# Patient Record
Sex: MALE | Race: WHITE | Employment: FULL TIME | URBAN - METROPOLITAN AREA
[De-identification: names, ages, dates, MRNs, and addresses within clinical notes are randomized per-mention and may not be internally consistent; named-entity substitution may affect disease eponyms.]

---

## 2018-07-30 ENCOUNTER — OFFICE VISIT (OUTPATIENT)
Dept: INTERNAL MEDICINE CLINIC | Facility: CLINIC | Age: 63
End: 2018-07-30
Payer: COMMERCIAL

## 2018-07-30 VITALS
BODY MASS INDEX: 37.04 KG/M2 | OXYGEN SATURATION: 95 % | SYSTOLIC BLOOD PRESSURE: 138 MMHG | RESPIRATION RATE: 16 BRPM | HEIGHT: 71.83 IN | DIASTOLIC BLOOD PRESSURE: 80 MMHG | WEIGHT: 270.5 LBS | HEART RATE: 96 BPM

## 2018-07-30 DIAGNOSIS — R94.31 ABNORMAL EKG: ICD-10-CM

## 2018-07-30 DIAGNOSIS — R03.0 ELEVATED BLOOD PRESSURE READING: Primary | ICD-10-CM

## 2018-07-30 DIAGNOSIS — Z12.5 SCREENING FOR PROSTATE CANCER: ICD-10-CM

## 2018-07-30 DIAGNOSIS — E66.09 CLASS 2 OBESITY DUE TO EXCESS CALORIES WITHOUT SERIOUS COMORBIDITY WITH BODY MASS INDEX (BMI) OF 36.0 TO 36.9 IN ADULT: ICD-10-CM

## 2018-07-30 PROBLEM — E66.812 CLASS 2 OBESITY DUE TO EXCESS CALORIES WITHOUT SERIOUS COMORBIDITY WITH BODY MASS INDEX (BMI) OF 36.0 TO 36.9 IN ADULT: Status: ACTIVE | Noted: 2018-07-30

## 2018-07-30 PROCEDURE — 99214 OFFICE O/P EST MOD 30 MIN: CPT | Performed by: INTERNAL MEDICINE

## 2018-07-30 PROCEDURE — 93000 ELECTROCARDIOGRAM COMPLETE: CPT | Performed by: INTERNAL MEDICINE

## 2018-07-30 NOTE — PROGRESS NOTES
Ac Gastelum is here for physical examination. He recently had cataract surgery and during the procedure and elevated blood pressure. He denies any chest pain shortness of breath. No orthopnea PND or pedal edema.   Did perform an EKG which showed a sinus rhythm

## 2018-08-17 ENCOUNTER — TELEPHONE (OUTPATIENT)
Dept: INTERNAL MEDICINE CLINIC | Facility: CLINIC | Age: 63
End: 2018-08-17

## 2018-08-17 DIAGNOSIS — R94.31 ABNORMAL EKG: ICD-10-CM

## 2018-08-17 DIAGNOSIS — R03.0 ELEVATED BLOOD PRESSURE READING: Primary | ICD-10-CM

## 2018-08-17 NOTE — TELEPHONE ENCOUNTER
Serena Sood called and wanted to verify order for treadmill CPX.  They need a new order for just treadmill stress

## 2018-08-20 ENCOUNTER — HOSPITAL ENCOUNTER (OUTPATIENT)
Dept: CV DIAGNOSTICS | Facility: HOSPITAL | Age: 63
Discharge: HOME | End: 2018-08-20
Attending: INTERNAL MEDICINE
Payer: COMMERCIAL

## 2018-08-20 DIAGNOSIS — R03.0 ELEVATED BLOOD PRESSURE READING: ICD-10-CM

## 2018-08-20 DIAGNOSIS — R94.31 ABNORMAL EKG: ICD-10-CM

## 2018-08-20 PROCEDURE — 93017 CV STRESS TEST TRACING ONLY: CPT | Performed by: INTERNAL MEDICINE

## 2018-08-20 PROCEDURE — 93018 CV STRESS TEST I&R ONLY: CPT | Performed by: INTERNAL MEDICINE

## 2018-08-22 NOTE — PROGRESS NOTES
Left a message with Hattie Arredondo. Hattie Arredondo did return my phone call. I informed him that the stress test was normal but blood pressure was elevated. Did recommend he measures and checks his blood pressure daily for a month and then fax results to me.   [facility]

## 2018-08-29 NOTE — PROGRESS NOTES
Spoke with patient on 8/29/18 regarding blood work needed per . Patient stated he was going out of town and will call to schedule blood work when he turns in September.

## 2019-08-29 ENCOUNTER — WALK IN (OUTPATIENT)
Dept: URGENT CARE | Age: 64
End: 2019-08-29

## 2019-08-29 VITALS
BODY MASS INDEX: 37.93 KG/M2 | SYSTOLIC BLOOD PRESSURE: 140 MMHG | TEMPERATURE: 98.9 F | DIASTOLIC BLOOD PRESSURE: 100 MMHG | WEIGHT: 280 LBS | HEIGHT: 72 IN | HEART RATE: 110 BPM

## 2019-08-29 DIAGNOSIS — J02.9 SORE THROAT: ICD-10-CM

## 2019-08-29 DIAGNOSIS — R03.0 ELEVATED BLOOD-PRESSURE READING WITHOUT DIAGNOSIS OF HYPERTENSION: ICD-10-CM

## 2019-08-29 DIAGNOSIS — H66.91 RIGHT OTITIS MEDIA, UNSPECIFIED OTITIS MEDIA TYPE: Primary | ICD-10-CM

## 2019-08-29 DIAGNOSIS — R05.9 COUGH: ICD-10-CM

## 2019-08-29 LAB
INTERNAL PROCEDURAL CONTROLS ACCEPTABLE: YES
S PYO AG THROAT QL IA.RAPID: NEGATIVE

## 2019-08-29 PROCEDURE — 87880 STREP A ASSAY W/OPTIC: CPT | Performed by: NURSE PRACTITIONER

## 2019-08-29 PROCEDURE — 99203 OFFICE O/P NEW LOW 30 MIN: CPT | Performed by: NURSE PRACTITIONER

## 2019-08-29 RX ORDER — AMOXICILLIN 875 MG/1
875 TABLET, COATED ORAL 2 TIMES DAILY
Qty: 20 TABLET | Refills: 0 | Status: SHIPPED | OUTPATIENT
Start: 2019-08-29 | End: 2019-09-08

## 2019-08-29 ASSESSMENT — ENCOUNTER SYMPTOMS
SORE THROAT: 1
CHILLS: 1
SINUS PRESSURE: 0
ALLERGIC/IMMUNOLOGIC NEGATIVE: 1
COUGH: 1
NEUROLOGICAL NEGATIVE: 1
FEVER: 1
ENDOCRINE NEGATIVE: 1
VOICE CHANGE: 0
GASTROINTESTINAL NEGATIVE: 1
WHEEZING: 0
RHINORRHEA: 0
CHEST TIGHTNESS: 0
SINUS PAIN: 0
EYES NEGATIVE: 1
SHORTNESS OF BREATH: 0

## 2020-04-24 ENCOUNTER — TELEPHONE (OUTPATIENT)
Dept: INTERNAL MEDICINE CLINIC | Facility: CLINIC | Age: 65
End: 2020-04-24

## 2020-04-27 ENCOUNTER — OFFICE VISIT (OUTPATIENT)
Dept: INTERNAL MEDICINE CLINIC | Facility: CLINIC | Age: 65
End: 2020-04-27
Payer: COMMERCIAL

## 2020-04-27 VITALS
HEART RATE: 98 BPM | OXYGEN SATURATION: 94 % | WEIGHT: 285 LBS | DIASTOLIC BLOOD PRESSURE: 120 MMHG | HEIGHT: 71.1 IN | SYSTOLIC BLOOD PRESSURE: 160 MMHG | TEMPERATURE: 99 F | BODY MASS INDEX: 39.46 KG/M2 | RESPIRATION RATE: 18 BRPM

## 2020-04-27 DIAGNOSIS — R03.0 ELEVATED BLOOD PRESSURE READING: Primary | ICD-10-CM

## 2020-04-27 DIAGNOSIS — E66.01 CLASS 2 SEVERE OBESITY DUE TO EXCESS CALORIES WITH SERIOUS COMORBIDITY AND BODY MASS INDEX (BMI) OF 39.0 TO 39.9 IN ADULT (HCC): ICD-10-CM

## 2020-04-27 DIAGNOSIS — Z01.818 PRE-OP TESTING: ICD-10-CM

## 2020-04-27 DIAGNOSIS — S69.92XA INJURY OF FINGER OF LEFT HAND, INITIAL ENCOUNTER: ICD-10-CM

## 2020-04-27 PROCEDURE — 93000 ELECTROCARDIOGRAM COMPLETE: CPT | Performed by: INTERNAL MEDICINE

## 2020-04-27 PROCEDURE — 99214 OFFICE O/P EST MOD 30 MIN: CPT | Performed by: INTERNAL MEDICINE

## 2020-04-27 PROCEDURE — 80053 COMPREHEN METABOLIC PANEL: CPT | Performed by: INTERNAL MEDICINE

## 2020-04-27 PROCEDURE — 85025 COMPLETE CBC W/AUTO DIFF WBC: CPT | Performed by: INTERNAL MEDICINE

## 2020-04-27 RX ORDER — LISINOPRIL 20 MG/1
20 TABLET ORAL DAILY
Qty: 30 TABLET | Refills: 11 | Status: SHIPPED | OUTPATIENT
Start: 2020-04-27 | End: 2021-04-29

## 2020-04-27 NOTE — PROGRESS NOTES
Brie Tyler is a 55-year-old gentleman who recently injured his left middle finger. He is scheduled to have surgery tomorrow. This is to be done under local block. His blood pressure is elevated. Brie Tyler is not seen a physician in over a year.   He denies any chest

## 2020-05-22 ENCOUNTER — TELEPHONE (OUTPATIENT)
Dept: INTERNAL MEDICINE CLINIC | Facility: CLINIC | Age: 65
End: 2020-05-22

## 2020-05-22 NOTE — TELEPHONE ENCOUNTER
Lakia from [facility] Dr. Sussy Dominguez office called to let Dr. Alfredito Pedroza know that the patient's recent blood pressure was 139/100. She states that she advised the patient to come in for a visit.

## 2020-10-26 ENCOUNTER — OFFICE VISIT (OUTPATIENT)
Dept: INTERNAL MEDICINE CLINIC | Facility: CLINIC | Age: 65
End: 2020-10-26
Payer: COMMERCIAL

## 2020-10-26 VITALS
HEART RATE: 93 BPM | RESPIRATION RATE: 18 BRPM | SYSTOLIC BLOOD PRESSURE: 118 MMHG | WEIGHT: 282.5 LBS | OXYGEN SATURATION: 98 % | TEMPERATURE: 97 F | HEIGHT: 72.08 IN | DIASTOLIC BLOOD PRESSURE: 74 MMHG | BODY MASS INDEX: 38.26 KG/M2

## 2020-10-26 DIAGNOSIS — I10 ESSENTIAL HYPERTENSION: ICD-10-CM

## 2020-10-26 DIAGNOSIS — Z01.818 PRE-OP EXAM: Primary | ICD-10-CM

## 2020-10-26 PROCEDURE — 99214 OFFICE O/P EST MOD 30 MIN: CPT | Performed by: INTERNAL MEDICINE

## 2020-10-26 PROCEDURE — 3008F BODY MASS INDEX DOCD: CPT | Performed by: INTERNAL MEDICINE

## 2020-10-26 PROCEDURE — 80053 COMPREHEN METABOLIC PANEL: CPT | Performed by: INTERNAL MEDICINE

## 2020-10-26 PROCEDURE — 93000 ELECTROCARDIOGRAM COMPLETE: CPT | Performed by: INTERNAL MEDICINE

## 2020-10-26 PROCEDURE — 3074F SYST BP LT 130 MM HG: CPT | Performed by: INTERNAL MEDICINE

## 2020-10-26 PROCEDURE — 3078F DIAST BP <80 MM HG: CPT | Performed by: INTERNAL MEDICINE

## 2020-10-26 NOTE — PROGRESS NOTES
This is a preop physical for Cr Parker. He is going to have a tenolysis left middle finger. This is [facility] to [facility] [place] [facility] [place]. He has no chief complaints at the present time.     Social history he is  does not smoke consumes about 1-2 al

## 2021-03-15 DIAGNOSIS — Z23 NEED FOR VACCINATION: ICD-10-CM

## 2021-04-29 RX ORDER — LISINOPRIL 20 MG/1
TABLET ORAL
Qty: 90 TABLET | Refills: 1 | Status: SHIPPED | OUTPATIENT
Start: 2021-04-29 | End: 2021-09-04

## 2021-05-24 ENCOUNTER — OFFICE VISIT (OUTPATIENT)
Dept: INTERNAL MEDICINE CLINIC | Facility: CLINIC | Age: 66
End: 2021-05-24
Payer: COMMERCIAL

## 2021-05-24 VITALS
WEIGHT: 285 LBS | SYSTOLIC BLOOD PRESSURE: 125 MMHG | OXYGEN SATURATION: 95 % | DIASTOLIC BLOOD PRESSURE: 85 MMHG | BODY MASS INDEX: 39 KG/M2 | HEART RATE: 94 BPM | RESPIRATION RATE: 18 BRPM

## 2021-05-24 DIAGNOSIS — N40.1 BENIGN PROSTATIC HYPERPLASIA WITH NOCTURIA: ICD-10-CM

## 2021-05-24 DIAGNOSIS — I10 ESSENTIAL HYPERTENSION: Primary | ICD-10-CM

## 2021-05-24 DIAGNOSIS — R35.1 BENIGN PROSTATIC HYPERPLASIA WITH NOCTURIA: ICD-10-CM

## 2021-05-24 PROCEDURE — 3074F SYST BP LT 130 MM HG: CPT | Performed by: INTERNAL MEDICINE

## 2021-05-24 PROCEDURE — 99213 OFFICE O/P EST LOW 20 MIN: CPT | Performed by: INTERNAL MEDICINE

## 2021-05-24 PROCEDURE — 3079F DIAST BP 80-89 MM HG: CPT | Performed by: INTERNAL MEDICINE

## 2021-05-24 RX ORDER — FINASTERIDE 5 MG/1
5 TABLET, FILM COATED ORAL DAILY
Qty: 30 TABLET | Refills: 11 | Status: SHIPPED | OUTPATIENT
Start: 2021-05-24 | End: 2021-09-20

## 2021-05-24 NOTE — PROGRESS NOTES
Problem 1 hypertension he denies headaches dizziness chest pain shortness of breath problem #2 did describe weight reducing diet problem 3 nocturia. Patient has not received physical with vaccination either has a reluctance to do this.     Physical examina

## 2021-09-04 RX ORDER — LISINOPRIL 20 MG/1
20 TABLET ORAL DAILY
Qty: 90 TABLET | Refills: 3 | Status: SHIPPED | OUTPATIENT
Start: 2021-09-04 | End: 2021-11-09

## 2021-09-20 RX ORDER — FINASTERIDE 5 MG/1
TABLET, FILM COATED ORAL
Qty: 90 TABLET | Refills: 3 | Status: SHIPPED | OUTPATIENT
Start: 2021-09-20

## 2021-11-09 DIAGNOSIS — I10 PRIMARY HYPERTENSION: Primary | ICD-10-CM

## 2021-11-09 RX ORDER — LISINOPRIL 20 MG/1
TABLET ORAL
Qty: 60 TABLET | Refills: 0 | Status: SHIPPED | OUTPATIENT
Start: 2021-11-09

## 2022-03-03 ENCOUNTER — TELEPHONE (OUTPATIENT)
Dept: INTERNAL MEDICINE CLINIC | Facility: CLINIC | Age: 67
End: 2022-03-03

## 2022-03-03 NOTE — TELEPHONE ENCOUNTER
Called patient to set up blood work appointment. Patient will call his insurance to make sure that it is okay for him to be drawn here in our office. If so he will call for an appointment if not orders will be mailed to him.

## 2022-03-09 RX ORDER — FINASTERIDE 5 MG/1
TABLET, FILM COATED ORAL
Qty: 180 TABLET | Refills: 1 | Status: SHIPPED | OUTPATIENT
Start: 2022-03-09

## 2022-05-10 RX ORDER — FINASTERIDE 5 MG/1
5 TABLET, FILM COATED ORAL DAILY
Qty: 30 TABLET | Refills: 0 | Status: SHIPPED | OUTPATIENT
Start: 2022-05-10

## 2022-05-10 RX ORDER — LISINOPRIL 20 MG/1
20 TABLET ORAL DAILY
Qty: 30 TABLET | Refills: 0 | Status: SHIPPED | OUTPATIENT
Start: 2022-05-10 | End: 2022-05-10

## 2022-05-10 RX ORDER — LISINOPRIL 20 MG/1
TABLET ORAL
Qty: 30 TABLET | Refills: 0 | Status: SHIPPED | OUTPATIENT
Start: 2022-05-10 | End: 2022-08-04

## 2022-05-10 NOTE — TELEPHONE ENCOUNTER
Future appt:    Last Appointment:  Visit date not found  No results found for: CHOLEST, HDL, LDL, TRIGLY, TRIG  No results found for: EAG, A1C  No results found for: T4F, TSH, TSHT4    No follow-ups on file.

## 2022-05-31 ENCOUNTER — TELEPHONE (OUTPATIENT)
Dept: INTERNAL MEDICINE CLINIC | Facility: CLINIC | Age: 67
End: 2022-05-31

## 2022-05-31 RX ORDER — LISINOPRIL 20 MG/1
TABLET ORAL
Qty: 30 TABLET | Refills: 0 | OUTPATIENT
Start: 2022-05-31

## 2022-08-01 DIAGNOSIS — Z13.21 ENCOUNTER FOR VITAMIN DEFICIENCY SCREENING: Primary | ICD-10-CM

## 2022-08-04 ENCOUNTER — NURSE ONLY (OUTPATIENT)
Dept: INTERNAL MEDICINE CLINIC | Facility: CLINIC | Age: 67
End: 2022-08-04
Payer: COMMERCIAL

## 2022-08-04 DIAGNOSIS — Z13.21 ENCOUNTER FOR VITAMIN DEFICIENCY SCREENING: ICD-10-CM

## 2022-08-04 DIAGNOSIS — I10 PRIMARY HYPERTENSION: Primary | ICD-10-CM

## 2022-08-04 LAB
ALBUMIN SERPL-MCNC: 3.5 G/DL (ref 3.4–5)
ALBUMIN/GLOB SERPL: 0.9 {RATIO} (ref 1–2)
ALP LIVER SERPL-CCNC: 57 U/L
ALT SERPL-CCNC: 38 U/L
ANION GAP SERPL CALC-SCNC: 6 MMOL/L (ref 0–18)
AST SERPL-CCNC: 27 U/L (ref 15–37)
BASOPHILS # BLD AUTO: 0.07 X10(3) UL (ref 0–0.2)
BASOPHILS NFR BLD AUTO: 1 %
BILIRUB SERPL-MCNC: 0.7 MG/DL (ref 0.1–2)
BUN BLD-MCNC: 16 MG/DL (ref 7–18)
CALCIUM BLD-MCNC: 9 MG/DL (ref 8.5–10.1)
CHLORIDE SERPL-SCNC: 110 MMOL/L (ref 98–112)
CHOLEST SERPL-MCNC: 213 MG/DL (ref ?–200)
CO2 SERPL-SCNC: 25 MMOL/L (ref 21–32)
COMPLEXED PSA SERPL-MCNC: 0.85 NG/ML (ref ?–4)
CREAT BLD-MCNC: 1.06 MG/DL
EOSINOPHIL # BLD AUTO: 0.14 X10(3) UL (ref 0–0.7)
EOSINOPHIL NFR BLD AUTO: 1.9 %
ERYTHROCYTE [DISTWIDTH] IN BLOOD BY AUTOMATED COUNT: 13.2 %
FASTING PATIENT LIPID ANSWER: YES
FASTING STATUS PATIENT QL REPORTED: YES
GFR SERPLBLD BASED ON 1.73 SQ M-ARVRAT: 77 ML/MIN/1.73M2 (ref 60–?)
GLOBULIN PLAS-MCNC: 3.8 G/DL (ref 2.8–4.4)
GLUCOSE BLD-MCNC: 97 MG/DL (ref 70–99)
HCT VFR BLD AUTO: 45.7 %
HDLC SERPL-MCNC: 42 MG/DL (ref 40–59)
HGB BLD-MCNC: 14.9 G/DL
IMM GRANULOCYTES # BLD AUTO: 0.05 X10(3) UL (ref 0–1)
IMM GRANULOCYTES NFR BLD: 0.7 %
LDLC SERPL CALC-MCNC: 147 MG/DL (ref ?–100)
LYMPHOCYTES # BLD AUTO: 1.89 X10(3) UL (ref 1–4)
LYMPHOCYTES NFR BLD AUTO: 25.9 %
MCH RBC QN AUTO: 29.1 PG (ref 26–34)
MCHC RBC AUTO-ENTMCNC: 32.6 G/DL (ref 31–37)
MCV RBC AUTO: 89.3 FL
MONOCYTES # BLD AUTO: 0.69 X10(3) UL (ref 0.1–1)
MONOCYTES NFR BLD AUTO: 9.5 %
NEUTROPHILS # BLD AUTO: 4.46 X10 (3) UL (ref 1.5–7.7)
NEUTROPHILS # BLD AUTO: 4.46 X10(3) UL (ref 1.5–7.7)
NEUTROPHILS NFR BLD AUTO: 61 %
NONHDLC SERPL-MCNC: 171 MG/DL (ref ?–130)
OSMOLALITY SERPL CALC.SUM OF ELEC: 293 MOSM/KG (ref 275–295)
PLATELET # BLD AUTO: 258 10(3)UL (ref 150–450)
POTASSIUM SERPL-SCNC: 4.5 MMOL/L (ref 3.5–5.1)
PROT SERPL-MCNC: 7.3 G/DL (ref 6.4–8.2)
RBC # BLD AUTO: 5.12 X10(6)UL
SODIUM SERPL-SCNC: 141 MMOL/L (ref 136–145)
TRIGL SERPL-MCNC: 132 MG/DL (ref 30–149)
VIT D+METAB SERPL-MCNC: 32.4 NG/ML (ref 30–100)
VLDLC SERPL CALC-MCNC: 25 MG/DL (ref 0–30)
WBC # BLD AUTO: 7.3 X10(3) UL (ref 4–11)

## 2022-08-04 PROCEDURE — 85025 COMPLETE CBC W/AUTO DIFF WBC: CPT | Performed by: INTERNAL MEDICINE

## 2022-08-04 PROCEDURE — 82306 VITAMIN D 25 HYDROXY: CPT | Performed by: INTERNAL MEDICINE

## 2022-08-04 PROCEDURE — 80061 LIPID PANEL: CPT | Performed by: INTERNAL MEDICINE

## 2022-08-04 PROCEDURE — 84153 ASSAY OF PSA TOTAL: CPT | Performed by: INTERNAL MEDICINE

## 2022-08-04 PROCEDURE — 80053 COMPREHEN METABOLIC PANEL: CPT | Performed by: INTERNAL MEDICINE

## 2022-08-04 RX ORDER — LISINOPRIL 20 MG/1
20 TABLET ORAL DAILY
Qty: 90 TABLET | Refills: 3 | Status: SHIPPED | OUTPATIENT
Start: 2022-08-04

## 2022-08-04 RX ORDER — FINASTERIDE 5 MG/1
5 TABLET, FILM COATED ORAL DAILY
Qty: 90 TABLET | Refills: 3 | Status: SHIPPED | OUTPATIENT
Start: 2022-08-04

## 2023-08-08 ENCOUNTER — OFFICE VISIT (OUTPATIENT)
Dept: INTERNAL MEDICINE CLINIC | Facility: CLINIC | Age: 68
End: 2023-08-08
Payer: COMMERCIAL

## 2023-08-08 VITALS
DIASTOLIC BLOOD PRESSURE: 90 MMHG | WEIGHT: 253.19 LBS | SYSTOLIC BLOOD PRESSURE: 124 MMHG | HEART RATE: 100 BPM | OXYGEN SATURATION: 99 % | BODY MASS INDEX: 34 KG/M2 | TEMPERATURE: 98 F

## 2023-08-08 DIAGNOSIS — Z12.5 SPECIAL SCREENING, PROSTATE CANCER: ICD-10-CM

## 2023-08-08 DIAGNOSIS — E78.5 DYSLIPIDEMIA: ICD-10-CM

## 2023-08-08 DIAGNOSIS — E66.3 OVERWEIGHT ON EXAMINATION: ICD-10-CM

## 2023-08-08 DIAGNOSIS — I10 PRIMARY HYPERTENSION: Primary | ICD-10-CM

## 2023-08-08 RX ORDER — FINASTERIDE 5 MG/1
5 TABLET, FILM COATED ORAL DAILY
Qty: 90 TABLET | Refills: 3 | Status: SHIPPED | OUTPATIENT
Start: 2023-08-08

## 2023-08-08 RX ORDER — LISINOPRIL 20 MG/1
20 TABLET ORAL DAILY
Qty: 90 TABLET | Refills: 3 | Status: SHIPPED | OUTPATIENT
Start: 2023-08-08

## 2023-08-08 NOTE — PROGRESS NOTES
Is a very pleasant 75-year-old gentleman here for blood pressure issues. Patient is on medication. Denies any headaches dizziness chest pain shortness of breath. Patient does have a history of nocturia. Currently is on finasteride. Other issue is overweight. Iva Leonard is exercising and eating appropriately and is lost 40 pounds. Still need to lose a few more pounds. Also has a history of dyslipidemia. Currently is not fasting. Physical examination very pleasant individual no acute distress normocephalic nonicteric carotids 1+ no bruits no thyromegaly or bruit. Lungs clear to auscultation. No cervical lymphadenopathy. Cardiovascular system S1-S2 regular. Abdomen soft nontender no organomegaly rebound or guarding bowel sounds active no bruits. Prostate mildly enlarged smooth no nodules. Extremities no edema cyanosis or clubbing no focal neurological signs. Psych appropriate mood and affect. Impression #1 hypertension with well-controlled refill of lisinopril. Check CBC and CMP problem #2 prostate cancer screening check PSA problem 3 overweight continue with dieting and exercise. Problem #4 dyslipidemia fasting lab ordered.

## 2023-08-10 ENCOUNTER — NURSE ONLY (OUTPATIENT)
Dept: INTERNAL MEDICINE CLINIC | Facility: CLINIC | Age: 68
End: 2023-08-10
Payer: COMMERCIAL

## 2023-08-10 DIAGNOSIS — I10 PRIMARY HYPERTENSION: ICD-10-CM

## 2023-08-10 DIAGNOSIS — Z12.5 SPECIAL SCREENING, PROSTATE CANCER: ICD-10-CM

## 2023-08-10 DIAGNOSIS — E78.5 DYSLIPIDEMIA: ICD-10-CM

## 2023-08-10 LAB
BASOPHILS # BLD AUTO: 0.06 X10(3) UL (ref 0–0.2)
BASOPHILS NFR BLD AUTO: 0.9 %
CHOLEST SERPL-MCNC: 197 MG/DL (ref ?–200)
COMPLEXED PSA SERPL-MCNC: 0.89 NG/ML (ref ?–4)
EOSINOPHIL # BLD AUTO: 0.21 X10(3) UL (ref 0–0.7)
EOSINOPHIL NFR BLD AUTO: 3 %
ERYTHROCYTE [DISTWIDTH] IN BLOOD BY AUTOMATED COUNT: 13.3 %
FASTING PATIENT LIPID ANSWER: YES
HCT VFR BLD AUTO: 45.2 %
HDLC SERPL-MCNC: 49 MG/DL (ref 40–59)
HGB BLD-MCNC: 14.9 G/DL
IMM GRANULOCYTES # BLD AUTO: 0.03 X10(3) UL (ref 0–1)
IMM GRANULOCYTES NFR BLD: 0.4 %
LDLC SERPL CALC-MCNC: 133 MG/DL (ref ?–100)
LYMPHOCYTES # BLD AUTO: 2.05 X10(3) UL (ref 1–4)
LYMPHOCYTES NFR BLD AUTO: 29.3 %
MCH RBC QN AUTO: 29.5 PG (ref 26–34)
MCHC RBC AUTO-ENTMCNC: 33 G/DL (ref 31–37)
MCV RBC AUTO: 89.5 FL
MONOCYTES # BLD AUTO: 0.62 X10(3) UL (ref 0.1–1)
MONOCYTES NFR BLD AUTO: 8.9 %
NEUTROPHILS # BLD AUTO: 4.03 X10 (3) UL (ref 1.5–7.7)
NEUTROPHILS # BLD AUTO: 4.03 X10(3) UL (ref 1.5–7.7)
NEUTROPHILS NFR BLD AUTO: 57.5 %
NONHDLC SERPL-MCNC: 148 MG/DL (ref ?–130)
PLATELET # BLD AUTO: 239 10(3)UL (ref 150–450)
RBC # BLD AUTO: 5.05 X10(6)UL
TRIGL SERPL-MCNC: 83 MG/DL (ref 30–149)
VLDLC SERPL CALC-MCNC: 15 MG/DL (ref 0–30)
WBC # BLD AUTO: 7 X10(3) UL (ref 4–11)

## 2023-08-10 PROCEDURE — 80061 LIPID PANEL: CPT | Performed by: INTERNAL MEDICINE

## 2023-08-10 PROCEDURE — 85025 COMPLETE CBC W/AUTO DIFF WBC: CPT | Performed by: INTERNAL MEDICINE

## 2023-10-18 ENCOUNTER — TELEPHONE (OUTPATIENT)
Dept: FAMILY MEDICINE CLINIC | Facility: CLINIC | Age: 68
End: 2023-10-18

## 2024-02-02 ENCOUNTER — OFFICE VISIT (OUTPATIENT)
Dept: INTERNAL MEDICINE CLINIC | Facility: CLINIC | Age: 69
End: 2024-02-02
Payer: COMMERCIAL

## 2024-02-02 VITALS
HEART RATE: 78 BPM | RESPIRATION RATE: 14 BRPM | DIASTOLIC BLOOD PRESSURE: 70 MMHG | TEMPERATURE: 98 F | WEIGHT: 239 LBS | HEIGHT: 72 IN | BODY MASS INDEX: 32.37 KG/M2 | OXYGEN SATURATION: 98 % | SYSTOLIC BLOOD PRESSURE: 122 MMHG

## 2024-02-02 DIAGNOSIS — Z12.5 PROSTATE CANCER SCREENING: ICD-10-CM

## 2024-02-02 DIAGNOSIS — Z00.00 ANNUAL PHYSICAL EXAM: Primary | ICD-10-CM

## 2024-02-02 DIAGNOSIS — R35.0 BENIGN PROSTATIC HYPERPLASIA WITH URINARY FREQUENCY: ICD-10-CM

## 2024-02-02 DIAGNOSIS — Z12.11 COLON CANCER SCREENING: ICD-10-CM

## 2024-02-02 DIAGNOSIS — I10 ESSENTIAL HYPERTENSION: ICD-10-CM

## 2024-02-02 DIAGNOSIS — N40.1 BENIGN PROSTATIC HYPERPLASIA WITH URINARY FREQUENCY: ICD-10-CM

## 2024-02-02 DIAGNOSIS — Z00.00 ENCOUNTER FOR ANNUAL HEALTH EXAMINATION: ICD-10-CM

## 2024-02-02 DIAGNOSIS — Z01.84 IMMUNITY STATUS TESTING: ICD-10-CM

## 2024-02-02 DIAGNOSIS — Z13.6 ENCOUNTER FOR ABDOMINAL AORTIC ANEURYSM (AAA) SCREENING: ICD-10-CM

## 2024-02-02 PROBLEM — E66.09 CLASS 2 OBESITY DUE TO EXCESS CALORIES WITHOUT SERIOUS COMORBIDITY WITH BODY MASS INDEX (BMI) OF 36.0 TO 36.9 IN ADULT: Status: RESOLVED | Noted: 2018-07-30 | Resolved: 2024-02-02

## 2024-02-02 PROBLEM — E66.812 CLASS 2 OBESITY DUE TO EXCESS CALORIES WITHOUT SERIOUS COMORBIDITY WITH BODY MASS INDEX (BMI) OF 36.0 TO 36.9 IN ADULT: Status: RESOLVED | Noted: 2018-07-30 | Resolved: 2024-02-02

## 2024-02-02 RX ORDER — LISINOPRIL 20 MG/1
20 TABLET ORAL DAILY
Qty: 90 TABLET | Refills: 3 | Status: SHIPPED | OUTPATIENT
Start: 2024-02-02

## 2024-02-02 NOTE — PROGRESS NOTES
Subjective:   Simon Harrison is a 68 year old male who presents for a MA (Medicare Advantage) Supervisit (Once per calendar year)   and scheduled follow up of multiple significant but stable problems.   HPI  Normal state of health  No complaints    PAST MEDICAL, SOCIAL, FAMILY HISTORIES REVIEWED WITH PT    History/Other:   Fall Risk Assessment:   He has been screened for Falls and is low risk.      Cognitive Assessment:   He had a completely normal cognitive assessment - see flowsheet entries       Functional Ability/Status:   Simon Harrison has a completely normal functional assessment. See flowsheet for details.      Depression Screening (PHQ-2/PHQ-9): PHQ-2 SCORE: 0  , done 2/2/2024            Advanced Directives:   He does NOT have a Living Will. [Do you have a living will?: No]  He does NOT have a Power of  for Health Care. [Do you have a healthcare power of ?: No]  Discussed Advance Care Planning with patient (and family/surrogate if present). Standard forms made available to patient in After Visit Summary.      Patient Active Problem List   Diagnosis    Essential hypertension    Benign prostatic hyperplasia with urinary frequency     Allergies:  He has No Known Allergies.    Current Medications:  Outpatient Medications Marked as Taking for the 2/2/24 encounter (Office Visit) with Gray Yoder MD   Medication Sig    lisinopril 20 MG Oral Tab Take 1 tablet (20 mg total) by mouth daily.    finasteride 5 MG Oral Tab Take 1 tablet (5 mg total) by mouth daily.       Medical History:  He  has no past medical history on file.  Surgical History:  He  has no past surgical history on file.   Family History:  His family history is not on file.  Social History:  He  reports that he has quit smoking. He has never used smokeless tobacco. He reports current alcohol use. He reports that he does not use drugs.    Tobacco:  He smoked tobacco in the past but quit greater than 12 months ago.  Social History     Tobacco Use      Smoking status: Former      Smokeless tobacco: Never       CAGE Alcohol Screen:   CAGE screening score of 0 on 2/2/2024, showing low risk of alcohol abuse.      Patient Care Team:  Gray Yoder MD as PCP - General (Internal Medicine)    Review of Systems  A comprehensive 10 point review of systems was completed.     Pertinent positives and negatives noted in the HPI.      Objective:   Physical Exam  General: No acute distress. Alert and oriented x 3.  HEENT: Normocephalic atraumatic. Moist mucous membranes. EOM-I. PERRLA. Anicteric.  Neck: No lymphadenopathy.   Respiratory: Clear to auscultation bilaterally. No wheezes. No rhonchi.  Cardiovascular: S1, S2. Regular rate and rhythm.   Abdomen: Soft,  no pain.  nontender, nondistended.  Positive bowel sounds. .  Neurologic: No focal neurological deficits.   Musculoskeletal: Moves all extremities.  Extremities: No edema or cyanosis.  Integument: No rashes or lesions.    Psychiatric: Appropriate mood and affect.    /70   Pulse 78   Temp 97.8 °F (36.6 °C)   Resp 14   Ht 6' (1.829 m)   Wt 239 lb (108.4 kg)   SpO2 98%   BMI 32.41 kg/m²  Estimated body mass index is 32.41 kg/m² as calculated from the following:    Height as of this encounter: 6' (1.829 m).    Weight as of this encounter: 239 lb (108.4 kg).    Medicare Hearing Assessment:   Hearing Screening    Screening Method: Whisper Test  Whisper Test Result: Pass               Visual Acuity:   Right Eye Visual Acuity: Corrected Right Eye Chart Acuity: 20/20   Left Eye Visual Acuity: Corrected Left Eye Chart Acuity: 20/20   Both Eyes Visual Acuity: Corrected Both Eyes Chart Acuity: 20/20   Able To Tolerate Visual Acuity: Yes        Assessment & Plan:   Simon Harrison is a 68 year old male who presents for a Medicare Assessment.     1. Annual physical exam (Primary)  2. Encounter for annual health examination  3. Immunity status testing  -     HCV Antibody; Future; Expected date:  02/02/2024  4. Prostate cancer screening  -     PSA Total, Screen; Future; Expected date: 02/02/2024  5. Essential hypertension  -     CBC With Differential With Platelet; Future; Expected date: 02/02/2024  -     Comp Metabolic Panel (14); Future; Expected date: 02/02/2024  -     Lipid Panel; Future; Expected date: 02/02/2024  -     TSH W Reflex To Free T4; Future; Expected date: 02/02/2024  -     Urinalysis, Routine; Future; Expected date: 02/02/2024  6. Benign prostatic hyperplasia with urinary frequency  7. Colon cancer screening  -     Occult Blood, Fecal, FIT Immunoassay; Future; Expected date: 02/02/2024  Other orders  -     Lisinopril; Take 1 tablet (20 mg total) by mouth daily.  Dispense: 90 tablet; Refill: 3     Immunity status testing  -     HCV Antibody; Future; Expected date: 02/02/2024   Prostate cancer screening  -     PSA Total, Screen; Future; Expected date: 02/02/2024  . Essential hypertension-controlled. Cont current med therapy  -     CBC With Differential With Platelet; Future; Expected date: 02/02/2024  -     Comp Metabolic Panel (14); Future; Expected date: 02/02/2024  -     Lipid Panel; Future; Expected date: 02/02/2024  -     TSH W Reflex To Free T4; Future; Expected date: 02/02/2024  -     Urinalysis, Routine; Future; Expected date: 02/02/2024   Benign prostatic hyperplasia with urinary frequency- stable monitor    Declines all vaccines    Check FOBT as he declies c-scope  The patient indicates understanding of these issues and agrees to the plan.  Continue with current treatment plan.  Lab work ordered.  Reinforced healthy diet, lifestyle, and exercise.      Return in about 1 year (around 2/2/2025) for HRA.     Gray Yoder MD, 2/2/2024     Supplementary Documentation:   General Health:  In the past six months, have you lost more than 10 pounds without trying?: 2 - No  Has your appetite been poor?: No  Type of Diet: Balanced;Vegetarian;Low Salt  How does the patient maintain a good energy  level?: Appropriate Exercise;Stretching;Other  How would you describe your daily physical activity?: Heavy  How would you describe your current health state?: Good  How do you maintain positive mental well-being?: Social Interaction;Visiting Friends;Visiting Family  On a scale of 0 to 10, with 0 being no pain and 10 being severe pain, what is your pain level?: 0 - (None)  In the past six months, have you experienced urine leakage?: 0-No  At any time do you feel concerned for the safety/well-being of yourself and/or your children, in your home or elsewhere?: No  Have you had any immunizations at another office such as Influenza, Hepatitis B, Tetanus, or Pneumococcal?: No        Siomn Harrison's SCREENING SCHEDULE   Tests on this list are recommended by your physician but may not be covered, or covered at this frequency, by your insurer.   Please check with your insurance carrier before scheduling to verify coverage.   PREVENTATIVE SERVICES FREQUENCY &  COVERAGE DETAILS LAST COMPLETION DATE   Diabetes Screening    Fasting Blood Sugar / Glucose    One screening every 12 months if never tested or if previously tested but not diagnosed with pre-diabetes   One screening every 6 months if diagnosed with pre-diabetes Lab Results   Component Value Date    GLU 97 08/04/2022        Cardiovascular Disease Screening    Lipid Panel  Cholesterol  Lipoprotein (HDL)  Triglycerides Covered every 5 years for all Medicare beneficiaries without apparent signs or symptoms of cardiovascular disease Lab Results   Component Value Date    CHOLEST 197 08/10/2023    HDL 49 08/10/2023     (H) 08/10/2023    TRIG 83 08/10/2023         Electrocardiogram (EKG)   Covered if needed at Welcome to Medicare, and non-screening if indicated for medical reasons 10/28/2020      Ultrasound Screening for Abdominal Aortic Aneurysm (AAA) Covered once in a lifetime for one of the following risk factors    Men who are 65-75 years old and have ever smoked     Anyone with a family history -     Colorectal Cancer Screening  Covered for ages 50-85; only need ONE of the following:    Colonoscopy   Covered every 10 years    Covered every 2 years if patient is at high risk or previous colonoscopy was abnormal -    Health Maintenance   Topic Date Due    Colorectal Cancer Screening  Never done       Flexible Sigmoidoscopy   Covered every 4 years -    Fecal Occult Blood Test Covered annually -   Prostate Cancer Screening    Prostate-Specific Antigen (PSA) Annually No results found for: \"PSA\"  Health Maintenance   Topic Date Due    PSA  08/10/2025      Immunizations    Influenza Covered once per flu season  Please get every year -  No recommendations at this time    Pneumococcal Each vaccine ([id number] & [id number]) covered once after 65 Prevnar 13: -    [id number]: -     No recommendations at this time    Hepatitis B One screening covered for patients with certain risk factors   -  No recommendations at this time    Tetanus Toxoid Not covered by Medicare Part B unless medically necessary (cut with metal); may be covered with your pharmacy prescription benefits -    Tetanus, Diptheria and Pertusis TD and TDaP Not covered by Medicare Part B -  No recommendations at this time    Zoster Not covered by Medicare Part B; may be covered with your pharmacy  prescription benefits -  No recommendations at this time     Annual Monitoring of Persistent Medications (ACE/ARB, digoxin diuretics, anticonvulsants)    Potassium Annually Lab Results   Component Value Date    K 4.5 08/04/2022         Creatinine   Annually Lab Results   Component Value Date    CREATSERUM 1.06 08/04/2022         BUN Annually Lab Results   Component Value Date    BUN 16 08/04/2022       Drug Serum Conc Annually No results found for: \"DIGOXIN\", \"DIG\", \"VALP\"

## 2024-02-02 NOTE — PATIENT INSTRUCTIONS
Simon Harrison's SCREENING SCHEDULE   Tests on this list are recommended by your physician but may not be covered, or covered at this frequency, by your insurer.   Please check with your insurance carrier before scheduling to verify coverage.   PREVENTATIVE SERVICES FREQUENCY &  COVERAGE DETAILS LAST COMPLETION DATE   Diabetes Screening    Fasting Blood Sugar / Glucose    One screening every 12 months if never tested or if previously tested but not diagnosed with pre-diabetes   One screening every 6 months if diagnosed with pre-diabetes Lab Results   Component Value Date    GLU 97 08/04/2022        Cardiovascular Disease Screening    Lipid Panel  Cholesterol  Lipoprotein (HDL)  Triglycerides Covered every 5 years for all Medicare beneficiaries without apparent signs or symptoms of cardiovascular disease Lab Results   Component Value Date    CHOLEST 197 08/10/2023    HDL 49 08/10/2023     (H) 08/10/2023    TRIG 83 08/10/2023         Electrocardiogram (EKG)   Covered if needed at Welcome to Medicare, and non-screening if indicated for medical reasons 10/28/2020      Ultrasound Screening for Abdominal Aortic Aneurysm (AAA) Covered once in a lifetime for one of the following risk factors   • Men who are 65-75 years old and have ever smoked   • Anyone with a family history -     Colorectal Cancer Screening  Covered for ages 50-85; only need ONE of the following:    Colonoscopy   Covered every 10 years    Covered every 2 years if patient is at high risk or previous colonoscopy was abnormal -    Health Maintenance   Topic Date Due   • Colorectal Cancer Screening  Never done       Flexible Sigmoidoscopy   Covered every 4 years -    Fecal Occult Blood Test Covered annually -   Prostate Cancer Screening    Prostate-Specific Antigen (PSA) Annually No results found for: \"PSA\"  Health Maintenance   Topic Date Due   • PSA  08/10/2025      Immunizations    Influenza Covered once per flu season  Please get every year  -  Influenza Vaccine(1) Never done    Pneumococcal Each vaccine (Ejfyoxo21 & Mlejtfbvw09) covered once after 65 Prevnar 13: -    Siywouvwz93: -     Pneumococcal Vaccination(1 of 1 - PCV) Never done    Hepatitis B One screening covered for patients with certain risk factors   -  No recommendations at this time    Tetanus Toxoid Not covered by Medicare Part B unless medically necessary (cut with metal); may be covered with your pharmacy prescription benefits -    Tetanus, Diptheria and Pertusis TD and TDaP Not covered by Medicare Part B -  No recommendations at this time    Zoster Not covered by Medicare Part B; may be covered with your pharmacy  prescription benefits -  Zoster Vaccines(1 of 2) Never done     Annual Monitoring of Persistent Medications (ACE/ARB, digoxin diuretics, anticonvulsants)    Potassium Annually Lab Results   Component Value Date    K 4.5 08/04/2022         Creatinine   Annually Lab Results   Component Value Date    CREATSERUM 1.06 08/04/2022         BUN Annually Lab Results   Component Value Date    BUN 16 08/04/2022       Drug Serum Conc Annually No results found for: \"DIGOXIN\", \"DIG\", \"VALP\"

## 2024-03-06 RX ORDER — FINASTERIDE 5 MG/1
5 TABLET, FILM COATED ORAL DAILY
Qty: 90 TABLET | Refills: 3 | OUTPATIENT
Start: 2024-03-06

## 2024-03-13 ENCOUNTER — HOSPITAL ENCOUNTER (EMERGENCY)
Facility: HOSPITAL | Age: 69
Discharge: HOME | End: 2024-03-13
Attending: EMERGENCY MEDICINE
Payer: MEDICARE

## 2024-03-13 ENCOUNTER — HOSPITAL ENCOUNTER (OUTPATIENT)
Dept: ULTRASOUND IMAGING | Age: 69
Discharge: HOME | End: 2024-03-13
Attending: INTERNAL MEDICINE
Payer: MEDICARE

## 2024-03-13 ENCOUNTER — APPOINTMENT (OUTPATIENT)
Dept: CT IMAGING | Facility: HOSPITAL | Age: 69
End: 2024-03-13
Attending: EMERGENCY MEDICINE
Payer: MEDICARE

## 2024-03-13 ENCOUNTER — LAB ENCOUNTER (OUTPATIENT)
Dept: LAB | Age: 69
End: 2024-03-13
Attending: INTERNAL MEDICINE
Payer: MEDICARE

## 2024-03-13 ENCOUNTER — TELEPHONE (OUTPATIENT)
Dept: INTERNAL MEDICINE CLINIC | Facility: CLINIC | Age: 69
End: 2024-03-13

## 2024-03-13 VITALS
SYSTOLIC BLOOD PRESSURE: 138 MMHG | BODY MASS INDEX: 32.1 KG/M2 | RESPIRATION RATE: 16 BRPM | WEIGHT: 237 LBS | OXYGEN SATURATION: 100 % | HEIGHT: 72 IN | TEMPERATURE: 98 F | HEART RATE: 64 BPM | DIASTOLIC BLOOD PRESSURE: 98 MMHG

## 2024-03-13 DIAGNOSIS — Z01.84 IMMUNITY STATUS TESTING: ICD-10-CM

## 2024-03-13 DIAGNOSIS — Z12.5 PROSTATE CANCER SCREENING: ICD-10-CM

## 2024-03-13 DIAGNOSIS — I71.43 INFRARENAL ABDOMINAL AORTIC ANEURYSM (AAA) WITHOUT RUPTURE (HCC): Primary | ICD-10-CM

## 2024-03-13 DIAGNOSIS — I10 ESSENTIAL HYPERTENSION: ICD-10-CM

## 2024-03-13 DIAGNOSIS — Z13.6 ENCOUNTER FOR ABDOMINAL AORTIC ANEURYSM (AAA) SCREENING: ICD-10-CM

## 2024-03-13 LAB
ALBUMIN SERPL-MCNC: 4.5 G/DL (ref 3.2–4.8)
ALBUMIN/GLOB SERPL: 1.5 {RATIO} (ref 1–2)
ALP LIVER SERPL-CCNC: 50 U/L
ALT SERPL-CCNC: 19 U/L
ANION GAP SERPL CALC-SCNC: 3 MMOL/L (ref 0–18)
ANION GAP SERPL CALC-SCNC: <0 MMOL/L (ref 0–18)
AST SERPL-CCNC: 21 U/L (ref ?–34)
BASOPHILS # BLD AUTO: 0.09 X10(3) UL (ref 0–0.2)
BASOPHILS NFR BLD AUTO: 1.4 %
BILIRUB SERPL-MCNC: 0.8 MG/DL (ref 0.2–1.1)
BILIRUB UR QL: NEGATIVE
BUN BLD-MCNC: 17 MG/DL (ref 9–23)
BUN BLD-MCNC: 17 MG/DL (ref 9–23)
BUN/CREAT SERPL: 16 (ref 10–20)
CALCIUM BLD-MCNC: 9.5 MG/DL (ref 8.7–10.4)
CALCIUM BLD-MCNC: 9.6 MG/DL (ref 8.5–10.1)
CHLORIDE SERPL-SCNC: 108 MMOL/L (ref 98–112)
CHLORIDE SERPL-SCNC: 110 MMOL/L (ref 98–112)
CHOLEST SERPL-MCNC: 210 MG/DL (ref ?–200)
CLARITY UR: CLEAR
CO2 SERPL-SCNC: 26 MMOL/L (ref 21–32)
CO2 SERPL-SCNC: 30 MMOL/L (ref 21–32)
COMPLEXED PSA SERPL-MCNC: 1.08 NG/ML (ref ?–4)
CREAT BLD-MCNC: 1 MG/DL
CREAT BLD-MCNC: 1.06 MG/DL
DEPRECATED RDW RBC AUTO: 43 FL (ref 35.1–46.3)
EGFRCR SERPLBLD CKD-EPI 2021: 76 ML/MIN/1.73M2 (ref 60–?)
EGFRCR SERPLBLD CKD-EPI 2021: 82 ML/MIN/1.73M2 (ref 60–?)
EOSINOPHIL # BLD AUTO: 0.18 X10(3) UL (ref 0–0.7)
EOSINOPHIL NFR BLD AUTO: 2.7 %
ERYTHROCYTE [DISTWIDTH] IN BLOOD BY AUTOMATED COUNT: 13.2 % (ref 11–15)
FASTING PATIENT LIPID ANSWER: YES
FASTING STATUS PATIENT QL REPORTED: YES
GLOBULIN PLAS-MCNC: 3 G/DL (ref 2.8–4.4)
GLUCOSE BLD-MCNC: 103 MG/DL (ref 70–99)
GLUCOSE BLD-MCNC: 105 MG/DL (ref 70–99)
GLUCOSE UR-MCNC: NORMAL MG/DL
HCT VFR BLD AUTO: 44.5 %
HCV AB SERPL QL IA: NONREACTIVE
HDLC SERPL-MCNC: 50 MG/DL (ref 40–59)
HGB BLD-MCNC: 15 G/DL
HGB UR QL STRIP.AUTO: NEGATIVE
IMM GRANULOCYTES # BLD AUTO: 0.03 X10(3) UL (ref 0–1)
IMM GRANULOCYTES NFR BLD: 0.5 %
KETONES UR-MCNC: NEGATIVE MG/DL
LDLC SERPL CALC-MCNC: 147 MG/DL (ref ?–100)
LEUKOCYTE ESTERASE UR QL STRIP.AUTO: NEGATIVE
LYMPHOCYTES # BLD AUTO: 2.32 X10(3) UL (ref 1–4)
LYMPHOCYTES NFR BLD AUTO: 34.8 %
MCH RBC QN AUTO: 30.1 PG (ref 26–34)
MCHC RBC AUTO-ENTMCNC: 33.7 G/DL (ref 31–37)
MCV RBC AUTO: 89.2 FL
MONOCYTES # BLD AUTO: 0.55 X10(3) UL (ref 0.1–1)
MONOCYTES NFR BLD AUTO: 8.3 %
NEUTROPHILS # BLD AUTO: 3.49 X10 (3) UL (ref 1.5–7.7)
NEUTROPHILS # BLD AUTO: 3.49 X10(3) UL (ref 1.5–7.7)
NEUTROPHILS NFR BLD AUTO: 52.3 %
NITRITE UR QL STRIP.AUTO: NEGATIVE
NONHDLC SERPL-MCNC: 160 MG/DL (ref ?–130)
OSMOLALITY SERPL CALC.SUM OF ELEC: 284 MOSM/KG (ref 275–295)
OSMOLALITY SERPL CALC.SUM OF ELEC: 286 MOSM/KG (ref 275–295)
PH UR: 6 [PH] (ref 5–8)
PLATELET # BLD AUTO: 209 10(3)UL (ref 150–450)
POTASSIUM SERPL-SCNC: 4.3 MMOL/L (ref 3.5–5.1)
POTASSIUM SERPL-SCNC: 4.4 MMOL/L (ref 3.5–5.1)
PROT SERPL-MCNC: 7.5 G/DL (ref 5.7–8.2)
PROT UR-MCNC: NEGATIVE MG/DL
RBC # BLD AUTO: 4.99 X10(6)UL
SODIUM SERPL-SCNC: 136 MMOL/L (ref 136–145)
SODIUM SERPL-SCNC: 137 MMOL/L (ref 136–145)
SP GR UR STRIP: 1.02 (ref 1–1.03)
TRIGL SERPL-MCNC: 75 MG/DL (ref 30–149)
TSI SER-ACNC: 2.83 MIU/ML (ref 0.55–4.78)
UROBILINOGEN UR STRIP-ACNC: NORMAL
VLDLC SERPL CALC-MCNC: 14 MG/DL (ref 0–30)
WBC # BLD AUTO: 6.7 X10(3) UL (ref 4–11)

## 2024-03-13 PROCEDURE — 81003 URINALYSIS AUTO W/O SCOPE: CPT

## 2024-03-13 PROCEDURE — 80048 BASIC METABOLIC PNL TOTAL CA: CPT | Performed by: EMERGENCY MEDICINE

## 2024-03-13 PROCEDURE — 86803 HEPATITIS C AB TEST: CPT

## 2024-03-13 PROCEDURE — 74174 CTA ABD&PLVS W/CONTRAST: CPT | Performed by: EMERGENCY MEDICINE

## 2024-03-13 PROCEDURE — 99285 EMERGENCY DEPT VISIT HI MDM: CPT

## 2024-03-13 PROCEDURE — 99284 EMERGENCY DEPT VISIT MOD MDM: CPT

## 2024-03-13 PROCEDURE — 85025 COMPLETE CBC W/AUTO DIFF WBC: CPT

## 2024-03-13 PROCEDURE — 80061 LIPID PANEL: CPT

## 2024-03-13 PROCEDURE — 36415 COLL VENOUS BLD VENIPUNCTURE: CPT

## 2024-03-13 PROCEDURE — 84443 ASSAY THYROID STIM HORMONE: CPT

## 2024-03-13 PROCEDURE — 76706 US ABDL AORTA SCREEN AAA: CPT | Performed by: INTERNAL MEDICINE

## 2024-03-13 PROCEDURE — 80053 COMPREHEN METABOLIC PANEL: CPT | Performed by: EMERGENCY MEDICINE

## 2024-03-13 NOTE — ED PROVIDER NOTES
Patient Seen in: [facility] Emergency Department      History     Chief Complaint   Patient presents with    Abnormal Result     Stated Complaint: routine US, showed Abdominal aortic aneurysm measuring up to 7.5 cm maximally. *    Subjective:   HPI    Patient had a screening ultrasound done today and he was found to have an aortic aneurysm.  He denies any complaints.  Specifically no abdominal pain no back pain or pain in his legs.  No numbness or weakness in his extremities.    Does have a history of hypertension.      Objective:   History reviewed. No pertinent past medical history.           History reviewed. No pertinent surgical history.             Social History     Socioeconomic History    Marital status:    Tobacco Use    Smoking status: Former    Smokeless tobacco: Never   Vaping Use    Vaping Use: Never used   Substance and Sexual Activity    Alcohol use: Yes     Comment: occ    Drug use: Never              Review of Systems    Positive for stated complaint: routine US, showed Abdominal aortic aneurysm measuring up to 7.5 cm maximally. *  Other systems are as noted in HPI.  Constitutional and vital signs reviewed.      All other systems reviewed and negative except as noted above.    Physical Exam     ED Triage Vitals [03/13/24 0950]   BP (!) 160/113   Pulse 76   Resp 16   Temp 97.9 °F (36.6 °C)   Temp src Temporal   SpO2 97 %   O2 Device None (Room air)       Current:BP (!) 138/98   Pulse 64   Temp 97.9 °F (36.6 °C) (Temporal)   Resp 16   Ht 182.9 cm (6')   Wt 107.5 kg   SpO2 100%   BMI 32.14 kg/m²         Physical Exam    Physical Exam   Constitutional: Awake, alert, well appearing  Head: Normocephalic and atraumatic.   Eyes: Conjunctivae are normal. Pupils are equal, round, and reactive to light.   Neck: Normal range of motion. Neck supple.   Cardiovascular: Normal rate, regular rhythm  Pulmonary/Chest: Normal effort.  No accessory muscle use.  No clubbing, no cyanosis.  Abdominal:  Soft. Bowel sounds are normal.   Neurological: Pt is alert and oriented to person, place, and time. no cranial nerve deficits  Skin: Skin is warm and dry.  No palpable mass noted in the abdomen      Pulses easily detected bilateral lower extremities both which are warm well-perfused    ED Course     Labs Reviewed   BASIC METABOLIC PANEL (8) - Abnormal; Notable for the following components:       Result Value    Glucose 105 (*)     All other components within normal limits             CTA ABD/PEL (CPT=74174)    Result Date: 3/13/2024  CONCLUSION:  1. Large infrarenal abdominal aortic aneurysm measuring 7.5 x 7.4 x 8.8 centimeters. 2. 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 3. 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  COMMUNICATION:  The findings were communicated with Dr. Rodriguez at 1334 on 3/13/2024. There was appropriate read back.  LOCATION:  [facility]   Dictated by (CST): Barry Fuentes MD on 3/13/2024 at 1:28 PM     Finalized by (CST): Barry Fuentes MD on 3/13/2024 at 1:35 PM       US ABDOMINAL AORTIC ANEURYSM SCREENING (CPT=76706)    Result Date: 3/13/2024  CONCLUSION:    Abdominal aortic aneurysm measuring up to 7.5 cm maximally.    Results of this examination will be called to the patient's physician by the performing sonographic technologist.   Dictated by (CST): Norris Jean Baptitse MD on 3/13/2024 at 8:27 AM     Finalized by (CST): Norris Jean Baptiste MD on 3/13/2024 at 8:28 AM                  MDM          Differential diagnoses considered: Abdominal aortic aneurysm, ruptured aneurysm    -Case discussed with Dr. Najjar, vascular surgery.  Request we obtain a CT angiogram of his abdomen here and discharged the patient as he is asymptomatic.  He will arrange for follow-up appointment tomorrow to discuss next steps.    Discussed CT scan results with patient.  He expressed clear understanding of the importance of follow-up and plans on seeing Dr. Reynolds tomorrow.        *My  independent interpretation of radiographs: No free fluid in the abdomen    *Discussion of ongoing management of this patient's care included: Dr. Najjar, vascular surgery  *Comorbidities contributing to the complexity of decision making: n/a  *External charts reviewed: n/a  *Additional sources of history: n/a    Shared decision making was done by: patient, myself.                                     Medical Decision Making      Disposition and Plan     Clinical Impression:  1. Infrarenal abdominal aortic aneurysm (AAA) without rupture (HCC)         Disposition:  Discharge  3/13/2024  1:46 pm    Follow-up:  Najjar, Samer F, MD  [address] [place] [id number]  [phone]    Follow up  If you are not called today for an appointment tomorrow, call tomorrow morning for the next available appointment.          Medications Prescribed:  Current Discharge Medication List

## 2024-03-13 NOTE — TELEPHONE ENCOUNTER
PT Called \" I Need you to call Dr Yoder  and tell him to Call me Immediately \"    I try to ask what's this about and he just said \"I finished Imaging and I need him to call me Now\"    Pt hang up. Sorry.

## 2024-03-13 NOTE — PROGRESS NOTES
Attempted to call [facility],spoke with Shea, introduced and notified we are sending pt to ER  Shea stated she has million people calling and you will have to hold on  Gave report regarding AAA, 3 min later  Spoke with pt, still at [facility], would like to speak with Dr. Yoder  Notified Dr. Yoder is recommending to go to ER now, pt v/u  Dr. Yoder notified to contact pt

## 2024-03-13 NOTE — ED INITIAL ASSESSMENT (HPI)
Pt states he had a US today and was told he has an aneurysm his aorta.  Pt states denies symptoms.  No SOB, chest pain.

## 2024-03-13 NOTE — DISCHARGE INSTRUCTIONS
Your CT scan today shows a large  aortic aneurysm that would likely require a procedure to prevent rupture.  Return to the ER immediately if you develop any abdominal pain or back pain.

## 2024-03-14 ENCOUNTER — PATIENT OUTREACH (OUTPATIENT)
Dept: CASE MANAGEMENT | Age: 69
End: 2024-03-14

## 2024-03-14 ENCOUNTER — TELEPHONE (OUTPATIENT)
Facility: CLINIC | Age: 69
End: 2024-03-14

## 2024-03-14 ENCOUNTER — OFFICE VISIT (OUTPATIENT)
Facility: CLINIC | Age: 69
End: 2024-03-14

## 2024-03-14 VITALS — WEIGHT: 237 LBS | HEIGHT: 72 IN | RESPIRATION RATE: 18 BRPM | BODY MASS INDEX: 32.1 KG/M2

## 2024-03-14 DIAGNOSIS — I71.43 INFRARENAL ABDOMINAL AORTIC ANEURYSM (AAA) WITHOUT RUPTURE (HCC): Primary | ICD-10-CM

## 2024-03-14 PROBLEM — Z78.9 HEPATITIS C ANTIBODY TEST NEGATIVE: Status: ACTIVE | Noted: 2024-03-14

## 2024-03-14 PROBLEM — Z53.20 STATIN DECLINED: Status: ACTIVE | Noted: 2024-03-14

## 2024-03-14 PROBLEM — Z91.89 FRAMINGHAM CARDIAC RISK 10-20% IN NEXT 10 YEARS: Status: ACTIVE | Noted: 2024-03-14

## 2024-03-14 NOTE — PROGRESS NOTES
1st attempt ER f/up apt request  No answer, LVMTCB to schedule apt  PCP -existing apt (2/4/25), for MA supervisit, unable to contact  [facility] -existing apt (3/15)  Closing encounter

## 2024-03-14 NOTE — TELEPHONE ENCOUNTER
Patient seen in the office, needs aortic endograft procedure. Scheduled for 03/22/2024. Patient agreed.    Submitted prior authorization through [facility] Portal, pending clinical review.  Reference #: [id number]

## 2024-03-15 ENCOUNTER — TELEPHONE (OUTPATIENT)
Facility: CLINIC | Age: 69
End: 2024-03-15

## 2024-03-15 NOTE — TELEPHONE ENCOUNTER
Patient stats if able to place in letter \"urgent life threatening & monthly monitoring for 9 months\".

## 2024-03-15 NOTE — PROGRESS NOTES
3rd attempt ER f/up apt request    PCP -decline, pt doesn't want to schedule at this time  [facility] -existing apt (3/15)  Closing encounter

## 2024-03-15 NOTE — TELEPHONE ENCOUNTER
Patient calling states had a trip but cannot go due to procedure and asking if able to provide letter stating pt cannot travel due to procedure. If can send letter to through [employer] or sent through email. Needs [email or web address]

## 2024-03-15 NOTE — TELEPHONE ENCOUNTER
Called patient to let him know that insurance approved procedure. He was receptive. Confirmed date of procedure again.

## 2024-03-15 NOTE — PROGRESS NOTES
2nd attempt ER f/up apt request  No answer, LVMTCB to schedule apt  PCP -existing apt (2/4/25), for MA supervisit, unable to contact  VASC SURG -existing apt (3/15)  Closing encounter

## 2024-03-18 ENCOUNTER — TELEPHONE (OUTPATIENT)
Facility: CLINIC | Age: 69
End: 2024-03-18

## 2024-03-18 DIAGNOSIS — I71.43 INFRARENAL ABDOMINAL AORTIC ANEURYSM (AAA) WITHOUT RUPTURE (HCC): Primary | ICD-10-CM

## 2024-03-18 NOTE — TELEPHONE ENCOUNTER
Medical letter can be created stating that patient has a vascular condition (Abdominal Aortic Aneurysm) which requires surgical intervention as soon as possible. For this reason, patient is not allowed to travel and upcoming travelling plans should be cancelled at this time. Patient will need close monitoring (for at least one year) after surgical procedure.

## 2024-03-19 ENCOUNTER — EKG ENCOUNTER (OUTPATIENT)
Dept: LAB | Facility: HOSPITAL | Age: 69
End: 2024-03-19
Attending: SURGERY
Payer: MEDICARE

## 2024-03-19 ENCOUNTER — LAB ENCOUNTER (OUTPATIENT)
Dept: LAB | Facility: HOSPITAL | Age: 69
End: 2024-03-19
Attending: SURGERY
Payer: MEDICARE

## 2024-03-19 DIAGNOSIS — Z01.818 PRE-OP TESTING: ICD-10-CM

## 2024-03-19 LAB
ANTIBODY SCREEN: NEGATIVE
ATRIAL RATE: 89 BPM
INR BLD: 1.05 (ref 0.8–1.2)
MRSA DNA SPEC QL NAA+PROBE: NEGATIVE
P AXIS: 28 DEGREES
P-R INTERVAL: 220 MS
PROTHROMBIN TIME: 14.3 SECONDS (ref 11.6–14.8)
Q-T INTERVAL: 372 MS
QRS DURATION: 100 MS
QTC CALCULATION (BEZET): 452 MS
R AXIS: 8 DEGREES
RH BLOOD TYPE: NEGATIVE
RH BLOOD TYPE: NEGATIVE
T AXIS: 25 DEGREES
VENTRICULAR RATE: 89 BPM

## 2024-03-19 PROCEDURE — 86901 BLOOD TYPING SEROLOGIC RH(D): CPT

## 2024-03-19 PROCEDURE — 85610 PROTHROMBIN TIME: CPT

## 2024-03-19 PROCEDURE — 87641 MR-STAPH DNA AMP PROBE: CPT

## 2024-03-19 PROCEDURE — 86920 COMPATIBILITY TEST SPIN: CPT

## 2024-03-19 PROCEDURE — 93010 ELECTROCARDIOGRAM REPORT: CPT | Performed by: INTERNAL MEDICINE

## 2024-03-19 PROCEDURE — 93005 ELECTROCARDIOGRAM TRACING: CPT

## 2024-03-19 PROCEDURE — 86850 RBC ANTIBODY SCREEN: CPT

## 2024-03-19 PROCEDURE — 86900 BLOOD TYPING SEROLOGIC ABO: CPT

## 2024-03-19 PROCEDURE — 36415 COLL VENOUS BLD VENIPUNCTURE: CPT

## 2024-03-21 ENCOUNTER — LAB ENCOUNTER (OUTPATIENT)
Dept: LAB | Facility: HOSPITAL | Age: 69
End: 2024-03-21
Attending: SURGERY
Payer: MEDICARE

## 2024-03-21 DIAGNOSIS — Z01.818 PRE-OP TESTING: ICD-10-CM

## 2024-03-21 LAB — SARS-COV-2 RNA RESP QL NAA+PROBE: NOT DETECTED

## 2024-03-22 ENCOUNTER — ANESTHESIA EVENT (OUTPATIENT)
Dept: INTERVENTIONAL RADIOLOGY/VASCULAR | Facility: HOSPITAL | Age: 69
End: 2024-03-22
Payer: MEDICARE

## 2024-03-22 ENCOUNTER — HOSPITAL ENCOUNTER (INPATIENT)
Dept: INTERVENTIONAL RADIOLOGY/VASCULAR | Facility: HOSPITAL | Age: 69
LOS: 1 days | Discharge: HOME | End: 2024-03-23
Attending: SURGERY | Admitting: SURGERY
Payer: MEDICARE

## 2024-03-22 DIAGNOSIS — I71.43 INFRARENAL ABDOMINAL AORTIC ANEURYSM (AAA) WITHOUT RUPTURE (HCC): ICD-10-CM

## 2024-03-22 DIAGNOSIS — Z01.818 PRE-OP TESTING: Primary | ICD-10-CM

## 2024-03-22 PROCEDURE — B41C1ZZ FLUOROSCOPY OF PELVIC ARTERIES USING LOW OSMOLAR CONTRAST: ICD-10-PCS | Performed by: SURGERY

## 2024-03-22 PROCEDURE — 99222 1ST HOSP IP/OBS MODERATE 55: CPT | Performed by: HOSPITALIST

## 2024-03-22 PROCEDURE — B4101ZZ FLUOROSCOPY OF ABDOMINAL AORTA USING LOW OSMOLAR CONTRAST: ICD-10-PCS | Performed by: SURGERY

## 2024-03-22 PROCEDURE — 04V03DZ RESTRICTION OF ABDOMINAL AORTA WITH INTRALUMINAL DEVICE, PERCUTANEOUS APPROACH: ICD-10-PCS | Performed by: SURGERY

## 2024-03-22 RX ORDER — ACETAMINOPHEN 500 MG
1000 TABLET ORAL ONCE
Status: COMPLETED | OUTPATIENT
Start: 2024-03-22 | End: 2024-03-22

## 2024-03-22 RX ORDER — ONDANSETRON 2 MG/ML
INJECTION INTRAMUSCULAR; INTRAVENOUS AS NEEDED
Status: DISCONTINUED | OUTPATIENT
Start: 2024-03-22 | End: 2024-03-22 | Stop reason: SURG

## 2024-03-22 RX ORDER — CEFAZOLIN SODIUM/WATER 2 G/20 ML
SYRINGE (ML) INTRAVENOUS
Status: COMPLETED
Start: 2024-03-22 | End: 2024-03-22

## 2024-03-22 RX ORDER — DEXAMETHASONE SODIUM PHOSPHATE 4 MG/ML
VIAL (ML) INJECTION AS NEEDED
Status: DISCONTINUED | OUTPATIENT
Start: 2024-03-22 | End: 2024-03-22 | Stop reason: SURG

## 2024-03-22 RX ORDER — ACETAMINOPHEN 325 MG/1
650 TABLET ORAL EVERY 4 HOURS PRN
Status: DISCONTINUED | OUTPATIENT
Start: 2024-03-22 | End: 2024-03-23

## 2024-03-22 RX ORDER — MORPHINE SULFATE 4 MG/ML
4 INJECTION, SOLUTION INTRAMUSCULAR; INTRAVENOUS EVERY 10 MIN PRN
Status: DISCONTINUED | OUTPATIENT
Start: 2024-03-22 | End: 2024-03-22 | Stop reason: HOSPADM

## 2024-03-22 RX ORDER — FAMOTIDINE 20 MG/1
20 TABLET, FILM COATED ORAL ONCE
Status: COMPLETED | OUTPATIENT
Start: 2024-03-22 | End: 2024-03-22

## 2024-03-22 RX ORDER — HEPARIN SODIUM 1000 [USP'U]/ML
INJECTION, SOLUTION INTRAVENOUS; SUBCUTANEOUS AS NEEDED
Status: DISCONTINUED | OUTPATIENT
Start: 2024-03-22 | End: 2024-03-22 | Stop reason: SURG

## 2024-03-22 RX ORDER — HEPARIN SODIUM 1000 [USP'U]/ML
INJECTION, SOLUTION INTRAVENOUS; SUBCUTANEOUS
Status: COMPLETED
Start: 2024-03-22 | End: 2024-03-22

## 2024-03-22 RX ORDER — NALOXONE HYDROCHLORIDE 0.4 MG/ML
80 INJECTION, SOLUTION INTRAMUSCULAR; INTRAVENOUS; SUBCUTANEOUS AS NEEDED
Status: DISCONTINUED | OUTPATIENT
Start: 2024-03-22 | End: 2024-03-22 | Stop reason: HOSPADM

## 2024-03-22 RX ORDER — FAMOTIDINE 10 MG/ML
20 INJECTION, SOLUTION INTRAVENOUS ONCE
Status: COMPLETED | OUTPATIENT
Start: 2024-03-22 | End: 2024-03-22

## 2024-03-22 RX ORDER — GLYCOPYRROLATE 0.2 MG/ML
INJECTION, SOLUTION INTRAMUSCULAR; INTRAVENOUS AS NEEDED
Status: DISCONTINUED | OUTPATIENT
Start: 2024-03-22 | End: 2024-03-22 | Stop reason: SURG

## 2024-03-22 RX ORDER — SODIUM CHLORIDE 9 MG/ML
INJECTION, SOLUTION INTRAVENOUS CONTINUOUS
Status: DISCONTINUED | OUTPATIENT
Start: 2024-03-22 | End: 2024-03-22

## 2024-03-22 RX ORDER — MIDAZOLAM HYDROCHLORIDE 1 MG/ML
INJECTION INTRAMUSCULAR; INTRAVENOUS
Status: COMPLETED
Start: 2024-03-22 | End: 2024-03-22

## 2024-03-22 RX ORDER — ONDANSETRON 2 MG/ML
4 INJECTION INTRAMUSCULAR; INTRAVENOUS EVERY 6 HOURS PRN
Status: DISCONTINUED | OUTPATIENT
Start: 2024-03-22 | End: 2024-03-23

## 2024-03-22 RX ORDER — CEFAZOLIN SODIUM/WATER 2 G/20 ML
2 SYRINGE (ML) INTRAVENOUS EVERY 8 HOURS
Qty: 40 ML | Refills: 0 | Status: COMPLETED | OUTPATIENT
Start: 2024-03-22 | End: 2024-03-23

## 2024-03-22 RX ORDER — ROCURONIUM BROMIDE 10 MG/ML
INJECTION, SOLUTION INTRAVENOUS AS NEEDED
Status: DISCONTINUED | OUTPATIENT
Start: 2024-03-22 | End: 2024-03-22 | Stop reason: SURG

## 2024-03-22 RX ORDER — HYDRALAZINE HYDROCHLORIDE 20 MG/ML
10 INJECTION INTRAMUSCULAR; INTRAVENOUS
Status: DISCONTINUED | OUTPATIENT
Start: 2024-03-22 | End: 2024-03-23

## 2024-03-22 RX ORDER — HYDROCODONE BITARTRATE AND ACETAMINOPHEN 5; 325 MG/1; MG/1
2 TABLET ORAL EVERY 4 HOURS PRN
Status: DISCONTINUED | OUTPATIENT
Start: 2024-03-22 | End: 2024-03-23

## 2024-03-22 RX ORDER — MORPHINE SULFATE 10 MG/ML
6 INJECTION, SOLUTION INTRAMUSCULAR; INTRAVENOUS EVERY 10 MIN PRN
Status: DISCONTINUED | OUTPATIENT
Start: 2024-03-22 | End: 2024-03-22 | Stop reason: HOSPADM

## 2024-03-22 RX ORDER — PROTAMINE SULFATE 10 MG/ML
INJECTION, SOLUTION INTRAVENOUS AS NEEDED
Status: DISCONTINUED | OUTPATIENT
Start: 2024-03-22 | End: 2024-03-22 | Stop reason: SURG

## 2024-03-22 RX ORDER — HYDROMORPHONE HYDROCHLORIDE 1 MG/ML
0.4 INJECTION, SOLUTION INTRAMUSCULAR; INTRAVENOUS; SUBCUTANEOUS EVERY 5 MIN PRN
Status: DISCONTINUED | OUTPATIENT
Start: 2024-03-22 | End: 2024-03-22 | Stop reason: HOSPADM

## 2024-03-22 RX ORDER — ENOXAPARIN SODIUM 100 MG/ML
40 INJECTION SUBCUTANEOUS EVERY 24 HOURS
Status: DISCONTINUED | OUTPATIENT
Start: 2024-03-22 | End: 2024-03-23

## 2024-03-22 RX ORDER — ACETAMINOPHEN 500 MG
TABLET ORAL
Status: COMPLETED
Start: 2024-03-22 | End: 2024-03-22

## 2024-03-22 RX ORDER — NEOSTIGMINE METHYLSULFATE 1 MG/ML
INJECTION, SOLUTION INTRAVENOUS AS NEEDED
Status: DISCONTINUED | OUTPATIENT
Start: 2024-03-22 | End: 2024-03-22 | Stop reason: SURG

## 2024-03-22 RX ORDER — DEXTROSE, SODIUM CHLORIDE, SODIUM LACTATE, POTASSIUM CHLORIDE, AND CALCIUM CHLORIDE 5; .6; .31; .03; .02 G/100ML; G/100ML; G/100ML; G/100ML; G/100ML
INJECTION, SOLUTION INTRAVENOUS CONTINUOUS
Status: DISCONTINUED | OUTPATIENT
Start: 2024-03-22 | End: 2024-03-23

## 2024-03-22 RX ORDER — MORPHINE SULFATE 2 MG/ML
1 INJECTION, SOLUTION INTRAMUSCULAR; INTRAVENOUS EVERY 2 HOUR PRN
Status: DISCONTINUED | OUTPATIENT
Start: 2024-03-22 | End: 2024-03-23

## 2024-03-22 RX ORDER — SODIUM CHLORIDE, SODIUM LACTATE, POTASSIUM CHLORIDE, CALCIUM CHLORIDE 600; 310; 30; 20 MG/100ML; MG/100ML; MG/100ML; MG/100ML
INJECTION, SOLUTION INTRAVENOUS CONTINUOUS
Status: DISCONTINUED | OUTPATIENT
Start: 2024-03-22 | End: 2024-03-22 | Stop reason: HOSPADM

## 2024-03-22 RX ORDER — HYDROCODONE BITARTRATE AND ACETAMINOPHEN 5; 325 MG/1; MG/1
1 TABLET ORAL EVERY 4 HOURS PRN
Status: DISCONTINUED | OUTPATIENT
Start: 2024-03-22 | End: 2024-03-23

## 2024-03-22 RX ORDER — HYDROMORPHONE HYDROCHLORIDE 1 MG/ML
0.6 INJECTION, SOLUTION INTRAMUSCULAR; INTRAVENOUS; SUBCUTANEOUS EVERY 5 MIN PRN
Status: DISCONTINUED | OUTPATIENT
Start: 2024-03-22 | End: 2024-03-22 | Stop reason: HOSPADM

## 2024-03-22 RX ORDER — METOCLOPRAMIDE HYDROCHLORIDE 5 MG/ML
INJECTION INTRAMUSCULAR; INTRAVENOUS
Status: DISPENSED
Start: 2024-03-22 | End: 2024-03-22

## 2024-03-22 RX ORDER — CEFAZOLIN SODIUM/WATER 2 G/20 ML
SYRINGE (ML) INTRAVENOUS AS NEEDED
Status: DISCONTINUED | OUTPATIENT
Start: 2024-03-22 | End: 2024-03-22 | Stop reason: SURG

## 2024-03-22 RX ORDER — MIDAZOLAM HYDROCHLORIDE 1 MG/ML
INJECTION INTRAMUSCULAR; INTRAVENOUS AS NEEDED
Status: DISCONTINUED | OUTPATIENT
Start: 2024-03-22 | End: 2024-03-22 | Stop reason: SURG

## 2024-03-22 RX ORDER — MORPHINE SULFATE 4 MG/ML
4 INJECTION, SOLUTION INTRAMUSCULAR; INTRAVENOUS EVERY 2 HOUR PRN
Status: DISCONTINUED | OUTPATIENT
Start: 2024-03-22 | End: 2024-03-23

## 2024-03-22 RX ORDER — EPHEDRINE SULFATE 50 MG/ML
INJECTION, SOLUTION INTRAVENOUS AS NEEDED
Status: DISCONTINUED | OUTPATIENT
Start: 2024-03-22 | End: 2024-03-22 | Stop reason: SURG

## 2024-03-22 RX ORDER — ASPIRIN 81 MG/1
81 TABLET ORAL DAILY
Status: DISCONTINUED | OUTPATIENT
Start: 2024-03-23 | End: 2024-03-23

## 2024-03-22 RX ORDER — HYDROMORPHONE HYDROCHLORIDE 1 MG/ML
0.2 INJECTION, SOLUTION INTRAMUSCULAR; INTRAVENOUS; SUBCUTANEOUS EVERY 5 MIN PRN
Status: DISCONTINUED | OUTPATIENT
Start: 2024-03-22 | End: 2024-03-22 | Stop reason: HOSPADM

## 2024-03-22 RX ORDER — METOCLOPRAMIDE 10 MG/1
10 TABLET ORAL ONCE
Status: COMPLETED | OUTPATIENT
Start: 2024-03-22 | End: 2024-03-22

## 2024-03-22 RX ORDER — PROTAMINE SULFATE 10 MG/ML
INJECTION, SOLUTION INTRAVENOUS
Status: COMPLETED
Start: 2024-03-22 | End: 2024-03-22

## 2024-03-22 RX ORDER — LISINOPRIL 20 MG/1
20 TABLET ORAL DAILY
Status: DISCONTINUED | OUTPATIENT
Start: 2024-03-22 | End: 2024-03-23

## 2024-03-22 RX ORDER — SODIUM CHLORIDE, SODIUM LACTATE, POTASSIUM CHLORIDE, CALCIUM CHLORIDE 600; 310; 30; 20 MG/100ML; MG/100ML; MG/100ML; MG/100ML
INJECTION, SOLUTION INTRAVENOUS CONTINUOUS
Status: DISCONTINUED | OUTPATIENT
Start: 2024-03-22 | End: 2024-03-22

## 2024-03-22 RX ORDER — METOCLOPRAMIDE HYDROCHLORIDE 5 MG/ML
10 INJECTION INTRAMUSCULAR; INTRAVENOUS ONCE
Status: COMPLETED | OUTPATIENT
Start: 2024-03-22 | End: 2024-03-22

## 2024-03-22 RX ORDER — METOCLOPRAMIDE HYDROCHLORIDE 5 MG/ML
10 INJECTION INTRAMUSCULAR; INTRAVENOUS EVERY 8 HOURS PRN
Status: DISCONTINUED | OUTPATIENT
Start: 2024-03-22 | End: 2024-03-23

## 2024-03-22 RX ORDER — FAMOTIDINE 20 MG/1
TABLET, FILM COATED ORAL
Status: COMPLETED
Start: 2024-03-22 | End: 2024-03-22

## 2024-03-22 RX ORDER — MORPHINE SULFATE 4 MG/ML
2 INJECTION, SOLUTION INTRAMUSCULAR; INTRAVENOUS EVERY 10 MIN PRN
Status: DISCONTINUED | OUTPATIENT
Start: 2024-03-22 | End: 2024-03-22 | Stop reason: HOSPADM

## 2024-03-22 RX ORDER — MORPHINE SULFATE 2 MG/ML
2 INJECTION, SOLUTION INTRAMUSCULAR; INTRAVENOUS EVERY 2 HOUR PRN
Status: DISCONTINUED | OUTPATIENT
Start: 2024-03-22 | End: 2024-03-23

## 2024-03-22 RX ORDER — LIDOCAINE HYDROCHLORIDE 20 MG/ML
INJECTION, SOLUTION INFILTRATION; PERINEURAL
Status: COMPLETED
Start: 2024-03-22 | End: 2024-03-22

## 2024-03-22 RX ADMIN — ACETAMINOPHEN 650 MG: 325 TABLET ORAL at 16:57:00

## 2024-03-22 RX ADMIN — DEXAMETHASONE SODIUM PHOSPHATE 4 MG: 4 MG/ML VIAL (ML) INJECTION at 12:25:00

## 2024-03-22 RX ADMIN — SODIUM CHLORIDE: 9 INJECTION, SOLUTION INTRAVENOUS at 12:03:00

## 2024-03-22 RX ADMIN — NEOSTIGMINE METHYLSULFATE 5 MG: 1 INJECTION, SOLUTION INTRAVENOUS at 13:30:00

## 2024-03-22 RX ADMIN — EPHEDRINE SULFATE 5 MG: 50 INJECTION, SOLUTION INTRAVENOUS at 12:45:00

## 2024-03-22 RX ADMIN — PROTAMINE SULFATE 50 MG: 10 INJECTION, SOLUTION INTRAVENOUS at 13:18:00

## 2024-03-22 RX ADMIN — SODIUM CHLORIDE, SODIUM LACTATE, POTASSIUM CHLORIDE, CALCIUM CHLORIDE: 600; 310; 30; 20 INJECTION, SOLUTION INTRAVENOUS at 11:11:00

## 2024-03-22 RX ADMIN — METOCLOPRAMIDE 10 MG: 10 TABLET ORAL at 10:05:00

## 2024-03-22 RX ADMIN — EPHEDRINE SULFATE 5 MG: 50 INJECTION, SOLUTION INTRAVENOUS at 12:18:00

## 2024-03-22 RX ADMIN — FAMOTIDINE 20 MG: 20 TABLET, FILM COATED ORAL at 10:06:00

## 2024-03-22 RX ADMIN — HYDRALAZINE HYDROCHLORIDE 10 MG: 20 INJECTION INTRAMUSCULAR; INTRAVENOUS at 16:55:00

## 2024-03-22 RX ADMIN — ACETAMINOPHEN 1000 MG: 500 MG TABLET ORAL at 10:06:00

## 2024-03-22 RX ADMIN — SODIUM CHLORIDE: 9 INJECTION, SOLUTION INTRAVENOUS at 13:03:00

## 2024-03-22 RX ADMIN — ONDANSETRON 4 MG: 2 INJECTION INTRAMUSCULAR; INTRAVENOUS at 13:25:00

## 2024-03-22 RX ADMIN — SODIUM CHLORIDE: 9 INJECTION, SOLUTION INTRAVENOUS at 13:02:00

## 2024-03-22 RX ADMIN — GLYCOPYRROLATE 0.6 MG: 0.2 INJECTION, SOLUTION INTRAMUSCULAR; INTRAVENOUS at 13:30:00

## 2024-03-22 RX ADMIN — MIDAZOLAM HYDROCHLORIDE 2 MG: 1 INJECTION INTRAMUSCULAR; INTRAVENOUS at 11:51:00

## 2024-03-22 RX ADMIN — SODIUM CHLORIDE: 9 INJECTION, SOLUTION INTRAVENOUS at 09:30:00

## 2024-03-22 RX ADMIN — EPHEDRINE SULFATE 5 MG: 50 INJECTION, SOLUTION INTRAVENOUS at 12:35:00

## 2024-03-22 RX ADMIN — EPHEDRINE SULFATE 5 MG: 50 INJECTION, SOLUTION INTRAVENOUS at 12:20:00

## 2024-03-22 RX ADMIN — CEFAZOLIN SODIUM/WATER 2 G: 2 G/20 ML SYRINGE (ML) INTRAVENOUS at 21:12:00

## 2024-03-22 RX ADMIN — DEXTROSE, SODIUM CHLORIDE, SODIUM LACTATE, POTASSIUM CHLORIDE, AND CALCIUM CHLORIDE: 5; .6; .31; .03; .02 INJECTION, SOLUTION INTRAVENOUS at 15:42:00

## 2024-03-22 RX ADMIN — ROCURONIUM BROMIDE 10 MG: 10 INJECTION, SOLUTION INTRAVENOUS at 12:36:00

## 2024-03-22 RX ADMIN — CEFAZOLIN SODIUM/WATER 2 G: 2 G/20 ML SYRINGE (ML) INTRAVENOUS at 12:13:00

## 2024-03-22 RX ADMIN — EPHEDRINE SULFATE 5 MG: 50 INJECTION, SOLUTION INTRAVENOUS at 12:16:00

## 2024-03-22 RX ADMIN — SODIUM CHLORIDE, SODIUM LACTATE, POTASSIUM CHLORIDE, CALCIUM CHLORIDE: 600; 310; 30; 20 INJECTION, SOLUTION INTRAVENOUS at 12:03:00

## 2024-03-22 RX ADMIN — LISINOPRIL 20 MG: 20 TABLET ORAL at 15:42:00

## 2024-03-22 RX ADMIN — ROCURONIUM BROMIDE 50 MG: 10 INJECTION, SOLUTION INTRAVENOUS at 11:54:00

## 2024-03-22 RX ADMIN — ENOXAPARIN SODIUM 40 MG: 100 INJECTION SUBCUTANEOUS at 21:12:00

## 2024-03-22 RX ADMIN — ACETAMINOPHEN 650 MG: 325 TABLET ORAL at 21:42:00

## 2024-03-22 RX ADMIN — SODIUM CHLORIDE, SODIUM LACTATE, POTASSIUM CHLORIDE, CALCIUM CHLORIDE: 600; 310; 30; 20 INJECTION, SOLUTION INTRAVENOUS at 14:46:00

## 2024-03-22 RX ADMIN — HEPARIN SODIUM 10000 UNITS: 1000 INJECTION, SOLUTION INTRAVENOUS; SUBCUTANEOUS at 12:41:00

## 2024-03-22 NOTE — ANESTHESIA PROCEDURE NOTES
Airway  Date/Time: 3/22/2024 11:56 AM  Urgency: Elective    Airway not difficult    General Information and Staff    Patient location during procedure: OR  Anesthesiologist: Shivani Gallego MD  Performed: anesthesiologist   Performed by: Shivani Gallego MD  Authorized by: Shivani Gallego MD      Indications and Patient Condition  Indications for airway management: anesthesia  Spontaneous Ventilation: absent  Sedation level: deep  Preoxygenated: yes  Patient position: sniffing  Mask difficulty assessment: 1 - vent by mask  Planned trial extubation    Final Airway Details  Final airway type: endotracheal airway      Successful airway: ETT  Cuffed: yes   Successful intubation technique: direct laryngoscopy  Facilitating devices/methods: intubating stylet  Endotracheal tube insertion site: oral  Blade size: #4  ETT size (mm): 8.0    Cormack-Lehane Classification: grade I - full view of glottis  Placement verified by: capnometry   Measured from: teeth  ETT to teeth (cm): 23  Number of attempts at approach: 1  Ventilation between attempts: none  Number of other approaches attempted: 0    Additional Comments  Le # 4

## 2024-03-22 NOTE — ANESTHESIA PROCEDURE NOTES
Arterial Line    Date/Time: 3/22/2024 1:55 AM    Performed by: Shivani Gallego MD  Authorized by: Shivani Gallego MD    General Information and Staff    Procedure Start:  3/22/2024 1:55 AM  Procedure End:  3/22/2024 12:03 PM  Anesthesiologist:  Shivani Gallego MD  Performed By:  Anesthesiologist  Patient Location:  OR  Indication: continuous blood pressure monitoring and blood sampling needed    Site Identification: surface landmarks    Preanesthetic Checklist: 2 patient identifiers, IV checked, risks and benefits discussed, monitors and equipment checked, pre-op evaluation, timeout performed, anesthesia consent and sterile technique used    Procedure Details    Catheter Size:  20 G  Catheter Length:  1 and 3/4 inch  Catheter Type:  Arrow  Seldinger Technique?: Yes    Laterality:  Left  Site:  Radial artery  Site Prep: chlorhexidine    Line Secured:  Wrist Brace, tape and Tegaderm    Assessment    Events: patient tolerated procedure well with no complications      Medications  3/22/2024 1:55 AM      Additional Comments

## 2024-03-22 NOTE — CONSULTS
[facility]    PATIENT'S NAME: TAMERA MENDOZA   ATTENDING PHYSICIAN: Samer F. Najjar, MD   CONSULTING PHYSICIAN: Magdiel Hawthorne MD   PATIENT ACCOUNT#:   [id number]    LOCATION:  [facility] PACU 8 [facility] 10  MEDICAL RECORD #:   [id number]       YOB: 1955  ADMISSION DATE:       03/22/2024      CONSULT DATE:  03/22/2024    REPORT OF CONSULTATION      REASON FOR CONSULTATION:  Abdominal aortic aneurysm endograft repair.    HISTORY OF PRESENT ILLNESS:  The patient is a 68-year-old  male who was found on a screening study ultrasound to have abdominal aortic aneurysm.  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  The patient was asymptomatic and referred to Vascular Surgery, Dr. Najjar, and scheduled today for above-mentioned procedure.  Postoperatively transferred to PACU for further monitoring.    PAST MEDICAL HISTORY:  Essential hypertension.  The patient denies any other medical problems.  Recent imaging studies showing infrarenal abdominal aortic aneurysm.    PAST SURGICAL HISTORY:  None.    MEDICATIONS:  Please see medication reconciliation list.    ALLERGIES:  No known drug allergies.    SOCIAL HISTORY:  No tobacco, alcohol, or drug use.  Lives with his family.  Independent in his basic activities of daily living.    REVIEW OF SYSTEMS:  Currently resting in bed.  No abdominal pain.  No chest pain, no shortness of breath.  Other 12-point review of systems negative.      PHYSICAL EXAMINATION:    GENERAL:  Alert.  Oriented to time, place, and person.  No acute distress.  VITAL SIGNS:  Temperature 98.3, pulse 53, respiratory rate 18, blood pressure 133/86, pulse ox 99% on 2L nasal cannula oxygen.  HEENT:  Atraumatic.  Oropharynx clear, moist mucous membranes.  Ears, nose normal.  Eyes:  Anicteric  sclerae.  NECK:  Supple.  No lymphadenopathy.  Trachea midline.  Full range of motion.  LUNGS:  Clear to auscultation bilaterally.  Normal respiratory effort.  HEART:  Regular rate and rhythm.  S1, S2 auscultated.  No murmur.  ABDOMEN:  Soft, nondistended, no tenderness.  Positive bowel sounds.  EXTREMITIES:  Bilateral groin dressing.  No edema, clubbing, or cyanosis.  Dorsalis pedis pulses palpated bilaterally.  NEUROLOGIC:  Motor and sensory intact.    ASSESSMENT AND PLAN:    1.   Infrarenal abdominal aortic aneurysm, status post endovascular abdominal aortic aneurysm repair using Hoopa device with stent.  Pain control, DVT prophylaxis, low dose aspirin, monitor hemodynamic status.  2.   Essential hypertension.  Continue home medications and monitor.    Dictated By Magdiel Hawthorne MD  d: 03/22/2024 14:40:37  t: 03/22/2024 14:51:35  Job [id number]  FB/

## 2024-03-22 NOTE — OPERATIVE REPORT
[facility]     PATIENTS NAME: Simon Harrison  ATTENDING PHYSICIAN: Najjar, Samer F, MD  OPERATING PHYSICIAN: Samer F Najjar, MD  CSN: [id number]     LOCATION:  OR  MRN: [id number]    YOB: 1955  ADMISSION DATE: 3/22/2024  OPERATION DATE: 3/22/2024                OPERATIVE PROCEDURE REPORT       PRE-OPERATIVE DIAGNOSIS:  Abdominal Aortic Aneurysm    POST-OPERATIVE DIAGNOSIS: Same as above.    PROCEDURE PERFORMED: Percutaneous Endovascular Abdominal Aortic Aneurysm Repair with the Bacliff Excluder Endograft (CPT 66495, 16765)    ANESTHESIA: General    SURGEON: Samer F Najjar, MD    CONTRAST AMOUNT: 60 ml    ASSISTANT: Interventional radiology staff, Hermes Ramirez SA (standby)    EBL: 20 ml    COMPLICATIONS: None    SUMMARY OF CASE: Minor Type II endoleak    INDICATIONS: The patient is a 68 year old male with history of a large abdominal aortic aneurysm. The size of the aneurysm was 7.9 cm. The infrarenal AAA was felt to be suitable for endovascular repair which was recommended and accepted.  The risks and benefits of this repair were discussed with him and his family in detail.  Those include risk of bleeding, iliac artery dissection or rupture, distal embolization, kinking, femoral artery complications, renal dysfunction, endoleak, late aneurysm rupture, migration, limb thrombosis, structural graft failure, graft infection, among other complications. Patient understood, signed informed consent, and expressed wish to proceed.    DESCRIPTION OF PROCEDURE:  The patient was brought to the catheterization lab and laid supine on the table.  After induction of general endotracheal anesthesia, both groin areas were prepped and draped in the usual surgical sterile fashion.  Access was established using a percutaneous technique through accessing the common femoral artery and dilating each artery with an 8-Fr sheath.  Perclose closure devices were deployed at 90° angles, but the sutures were left loose .  8-Fr  sheaths were placed on both sides, and the patient was anticoagulated with 5,000 units of heparin.  The sheaths were then exchanged for a 16-English sheath on the right and 12-Fr sheath on the left over 180-cm superstiff Amplatz wires.  A 5-Fr pigtail catheter was then advanced via the left side and an aortogram and pelvic angiogram with a power injection of intravenous contrast was then performed which allowed us to measure the length of the aorta from the lowest renal artery to the ipsilateral common iliac artery bifurcation. The lowest renal artery was marked.   The pigtail catheter was then advanced to the suprarenal aorta through the contralateral side. The diameter of the main body of the endograft was previously chosen by evaluating the preoperative CT angiogram. Then based on our preoperative imaging and procedural imaging, a 23 mm x 14.5 mm x 12 cm device will allow the best option for seal. The main body was then advanced from the right side. The endograft was oriented under fluoroscopy to position the proximal marker of the endograft right under the lowest renal artery. The contralateral gate was oriented slightly anterior in order to facilitate subsequent cannulation. A repeat aortogram orthogonal to the neck with magnification for final positioning was obtained at a 15° cranial angulation. The long sheath was pulled back and the proximal trunk of the endograft was deployed by loosening the deployment white knob while exerting a continuous pull to open the contralateral gate while leaving the ipsilateral limb constrained.  The position of the endograft was confirmed with fluoroscopy. Once we were satisfied with the position, the final placement was achieved by disengaging the constraining mechanism. Then, the  pigtail catheter was exchanged over a 0.035 Glidewire Advantage wire which was then advanced from the contralateral side over a Sanders catheter and the gate was cannulated.   The marker pigtail  catheter was reintroduced through the left femoral sheath over an Amplatz wire and a retrograde left iliac angiogram was obtained to fabrice the common iliac artery bifurcation and confirm the length of the desired limb. A 16 mm x 14.5 mm x 14 cm contralateral limb device was prepared and advanced and deployed at the graft bifurcation and the distal end landed short of the bifurcation so a limb extender 16 mm x 14.5 mm x 10 cm was deployed and it landed just above the internal iliac artery takeoff.  This was followed by final deployment of the ipsilateral leg by rotating the gray deployment knob. Then an ipsilateral iliac bell-bottom endoprosthesis 16 mm x 18 mm x 11.5 cm was deployed and landed just above the ipsilateral internal iliac artery.  Then, the MOB balloon was used to angioplasty the neck, the sealing zones of the iliac limbs and the zone of overlap between the main body and the contralateral iliac limb. A completion angiogram was obtained while withdrawing blood from the iliac sheaths. This confirmed the absence of correctable endoleaks. There The sheaths and catheters were removed and the heparin was reversed with protamine. The arteriotomies were closed with tightening of the Perclose sutures. Pressure was held until hemostasis was achieved.  Dermabond was used to seal the arteriotomy incision sites followed by steri-strips, 4x4 gauze and Tegaderm over the incision sites.  Both feet were inspected and were warm and well perfused with palpable PT pulses. There were no complications.

## 2024-03-22 NOTE — ANESTHESIA PREPROCEDURE EVALUATION
Anesthesia PreOp Note    HPI:     Simon Harrison is a 68 year old male who presents for preoperative consultation requested by: * No surgeons listed *    Date of Surgery: 3/22/2024    * No procedures listed *  Indication: * No pre-op diagnosis entered *    Relevant Problems   No relevant active problems       NPO:  Last Liquid Consumption Date: 03/21/24  Last Liquid Consumption Time: 1700  Last Solid Consumption Date: 03/21/24  Last Solid Consumption Time: 1700  Last Liquid Consumption Date: 03/21/24          History Review:  Patient Active Problem List    Diagnosis Date Noted    Infrarenal abdominal aortic aneurysm (AAA) without rupture (HCC) 7.5 cm 03/14/2024    Winters cardiac risk 10-20% in next 10 years 03/14/2024    Hepatitis C antibody test negative 03/14/2024    Statin declined 03/14/2024    Essential hypertension 02/02/2024    Benign prostatic hyperplasia with urinary frequency 02/02/2024       No past medical history on file.    No past surgical history on file.    Medications Prior to Admission   Medication Sig Dispense Refill Last Dose    lisinopril 20 MG Oral Tab Take 1 tablet (20 mg total) by mouth daily. (Patient taking differently: Take 0.5 tablets (10 mg total) by mouth every evening.) 90 tablet 3 Past Week     Current Facility-Administered Medications Ordered in Epic   Medication Dose Route Frequency Provider Last Rate Last Admin    lactated ringers infusion   Intravenous Continuous Najjar, Samer F, MD        sodium chloride 0.9% infusion   Intravenous Continuous Najjar, Samer F, MD 10 mL/hr at 03/22/24 0930 New Bag at 03/22/24 0930    metoclopramide (Reglan) 5 mg/mL injection              No current [facility]-ordered outpatient medications on file.       No Known Allergies    No family history on file.  Social History     Socioeconomic History    Marital status:    Tobacco Use    Smoking status: Never    Smokeless tobacco: Never   Vaping Use    Vaping Use: Never used   Substance and Sexual  Activity    Alcohol use: Yes     Comment: occ    Drug use: Never       Available pre-op labs reviewed.  Lab Results   Component Value Date    WBC 6.7 03/13/2024    RBC 4.99 03/13/2024    HGB 15.0 03/13/2024    HCT 44.5 03/13/2024    MCV 89.2 03/13/2024    MCH 30.1 03/13/2024    MCHC 33.7 03/13/2024    RDW 13.2 03/13/2024    .0 03/13/2024     Lab Results   Component Value Date     03/13/2024    K 4.4 03/13/2024     03/13/2024    CO2 26.0 03/13/2024    BUN 17 03/13/2024    CREATSERUM 1.00 03/13/2024     (H) 03/13/2024    CA 9.6 03/13/2024     Lab Results   Component Value Date    INR 1.05 03/19/2024       Vital Signs:  Body mass index is 32.01 kg/m².   height is 1.829 m (6') and weight is 107 kg (236 lb). His temporal temperature is 97.8 °F (36.6 °C). His blood pressure is 146/101 (abnormal) and his pulse is 82. His respiration is 17 and oxygen saturation is 97%.   Vitals:    03/18/24 1319 03/22/24 0943   BP:  (!) 146/101   Pulse:  82   Resp:  17   Temp:  97.8 °F (36.6 °C)   TempSrc:  Temporal   SpO2:  97%   Weight: 107.5 kg (237 lb) 107 kg (236 lb)   Height: 1.829 m (6') 1.829 m (6')        Anesthesia Evaluation     Patient summary reviewed and Nursing notes reviewed    Airway   Mallampati: II  TM distance: >3 FB  Neck ROM: full  Dental          Pulmonary - normal exam   Cardiovascular - normal exam  Exercise tolerance: good  (+) hypertension well controlled    NYHA Classification: I  ECG reviewed  ROS comment: Narrative  Performed by: [facility]    Sinus rhythm with 1st degree A-V block  Possible Inferior infarct , age undetermined  Abnormal ECG  No previous ECGs found in [facility]  Confirmed by Jose Pierson ([id number]) on 3/19/2024 11:37:11 AM    Specimen Collected: 03/19/24 09:01          Neuro/Psych - negative ROS     GI/Hepatic/Renal    (-) hepatitis, liver disease    Endo/Other    (-) diabetes mellitus, hypothyroidism    Comments:    Assessment & Plan:   Simon Harrison is a 68 year old male who  presents for a Medicare Assessment.      1. Annual physical exam (Primary)  2. Encounter for annual health examination  3. Immunity status testing  -     HCV Antibody; Future; Expected date: 02/02/2024  4. Prostate cancer screening  -     PSA Total, Screen; Future; Expected date: 02/02/2024  5. Essential hypertension  -     CBC With Differential With Platelet; Future; Expected date: 02/02/2024  -     Comp Metabolic Panel (14); Future; Expected date: 02/02/2024  -     Lipid Panel; Future; Expected date: 02/02/2024  -     TSH W Reflex To Free T4; Future; Expected date: 02/02/2024  -     Urinalysis, Routine; Future; Expected date: 02/02/2024  6. Benign prostatic hyperplasia with urinary frequency  7. Colon cancer screening  -     Occult Blood, Fecal, FIT Immunoassay; Future; Expected date: 02/02/2024  Other orders  -     Lisinopril; Take 1 tablet (20 mg total) by mouth daily.  Dispense: 90 tablet; Refill: 3      Immunity status testing  -     HCV Antibody; Future; Expected date: 02/02/2024   Prostate cancer screening  -     PSA Total, Screen; Future; Expected date: 02/02/2024  . Essential hypertension-controlled. Cont current med therapy  -     CBC With Differential With Platelet; Future; Expected date: 02/02/2024  -     Comp Metabolic Panel (14); Future; Expected date: 02/02/2024  -     Lipid Panel; Future; Expected date: 02/02/2024  -     TSH W Reflex To Free T4; Future; Expected date: 02/02/2024  -     Urinalysis, Routine; Future; Expected date: 02/02/2024   Benign prostatic hyperplasia with urinary frequency- stable monitor       Abdominal  - normal exam                 Anesthesia Plan:   ASA:  3  Plan:   General  Monitors and Lines:   A-line, Additonal IV and SSEP/MEP  Airway:  ETT  Informed Consent Plan and Risks Discussed With:  Patient and spouse  Discussed plan with:  Attending and surgeon  Provider Attestation (if preop done by other):  GA/ETT/PONV,dental damages etc      I have informed Simon Harrison  and/or legal guardian or family member of the nature of the anesthetic plan, benefits, risks including possible dental damage if relevant, major complications, and any alternative forms of anesthetic management.   All of the patient's questions were answered to the best of my ability. The patient desires the anesthetic management as planned.  I have informed Simon Harrison   of the risks of arterial lines including, but not limited to: failure, infection, bleeding, nerve damage, and vascular occlusion. The patient desires the line as proposed.    SHANE TEMPLETON MD  3/22/2024 10:45 AM  Present on Admission:  **None**

## 2024-03-22 NOTE — ANESTHESIA POSTPROCEDURE EVALUATION
Patient: Simon Harrison    Procedure Summary       Date: 03/22/24 Room / Location: [facility] Interventional [facility]    Anesthesia Start: 1141 Anesthesia Stop:     Procedure: IR REPAIR AAA ENDOVASCULAR Diagnosis:       Infrarenal abdominal aortic aneurysm (AAA) without rupture (HCC)      Infrarenal abdominal aortic aneurysm (AAA) without rupture (HCC)    Scheduled Providers: Najjar, Samer F, MD; Aayush Schmidt MD Anesthesiologist: Shivani Templeton MD    Anesthesia Type: general ASA Status: 3            Anesthesia Type: general    Vitals Value Taken Time   /89 03/22/24 1350   Temp 98.3 °F (36.8 °C) 03/22/24 1349   Pulse 79 03/22/24 1350   Resp 20 03/22/24 1349   SpO2 99 % 03/22/24 1350   Vitals shown include unfiled device data.    [facility] AN Post Evaluation:   Patient Evaluated in PACU  Patient Participation: complete - patient participated  Level of Consciousness: awake  Pain Score: 1  Pain Management: adequate  Airway Patency:patent  Dental exam unchanged from preop  Yes    Nausea/Vomiting: none  Cardiovascular Status: stable  Respiratory Status: nasal cannula  Postoperative Hydration stable      SHIVAIN TEMPLETON MD  3/22/2024 1:50 PM

## 2024-03-22 NOTE — H&P
Samer F. Najjar, MD  Vascular Surgery  [facility]                     VASCULAR SURGERY   CLINIC NOTE           Name: Simon Harrison   :   3/23/1955  [id number]      REFERRING PHYSICIAN:  No ref. provider found  PRIMARY CARE PHYSICIAN:  Gray Yoder MD     HISTORY OF PRESENT ILLNESS:   Patient is a 68 year old male who is here for  his abdominal aortic aneurysm treatment.  The patient had a screening abdominal ultrasound that revealed a large aneurysm and this was followed by referral to the ER where a CT angiogram was performed that revealed a 7.9 cm infrarenal aneurysm.  The patient has no family history of aneurysms.  He is otherwise very healthy and exercises on a daily basis with rowing and treadmill and weightlifting.  He denies any back pain.           PAST MEDICAL HISTORY:    No past medical history on file.     PAST SURGICAL HISTORY:   No past surgical history on file.      MEDICATIONS:      Current Outpatient Medications:     lisinopril 20 MG Oral Tab, Take 1 tablet (20 mg total) by mouth daily., Disp: 90 tablet, Rfl: 3    finasteride 5 MG Oral Tab, Take 1 tablet (5 mg total) by mouth daily., Disp: 90 tablet, Rfl: 3     ALLERGIES:    He has No Known Allergies.     SOCIAL HISTORY:    Patient  reports that he has never smoked. He has never used smokeless tobacco. He reports current alcohol use. He reports that he does not use drugs.     FAMILY HISTORY:    Patient's family history is not on file.     ROS:     A 12 point review of systems with pertinent positives and negatives listed in the HPI.     EXAM:     Resp 18   Ht 6' (1.829 m)   Wt 237 lb (107.5 kg)   BMI 32.14 kg/m²      RESPIRATORY: no rales, rhonchi, or wheezes B  CARDIO: RRR without murmur, no murmur, no gallop   ABDOMEN: soft, non-tender   VASCULAR:   Easily palpable bilateral femoral and posterior tibial pulses   His popliteal arteries are nonaneurysmal        IMAGING:   The CT angiogram was reviewed with the patient      ASSESSMENT  Diagnoses and all orders for this visit:     Infrarenal abdominal aortic aneurysm (AAA) without rupture (HCC)           I explained to the patient that based on his CT angiogram of the abdomen and pelvis the aneurysm has crossed the size of 5 cm,  I would recommend early repair.  The aneurysm is amenable to endovascular repair.  We also discussed open repair. The risks of endovascular repair included: access site complications (both open and percutaneous), endoleaks (all types), device migration, separation of components, limb kinking and occlusion, endograft infection, in addition to cardiopulmonary complications, intravenous contrast complications (both contrast-induced nephropathy and allergic reactions) and ischemic complications (renal, intestinal, extremity, pelvic and spinal) including conversion to an open procedure and death.  We also discussed the signs and symptoms of a ruptured aneurysm with the importance of proceeding to the emergency room in that event.  All questions were answered.    The patient wants to proceed with repair.   He is will not require cardiac clearance given that he is in excellent physical shape and does not smoke.        PLAN:  Endovascular abdominal aortic aneurysm repair using the GORE device           Sincerely,  Samer F. Najjar MD

## 2024-03-23 VITALS
OXYGEN SATURATION: 98 % | DIASTOLIC BLOOD PRESSURE: 92 MMHG | WEIGHT: 236 LBS | BODY MASS INDEX: 31.97 KG/M2 | SYSTOLIC BLOOD PRESSURE: 125 MMHG | RESPIRATION RATE: 17 BRPM | HEIGHT: 72 IN | HEART RATE: 97 BPM | TEMPERATURE: 99 F

## 2024-03-23 LAB
ANION GAP SERPL CALC-SCNC: 5 MMOL/L (ref 0–18)
BUN BLD-MCNC: 10 MG/DL (ref 9–23)
BUN/CREAT SERPL: 11.8 (ref 10–20)
CALCIUM BLD-MCNC: 8.5 MG/DL (ref 8.7–10.4)
CHLORIDE SERPL-SCNC: 111 MMOL/L (ref 98–112)
CO2 SERPL-SCNC: 27 MMOL/L (ref 21–32)
CREAT BLD-MCNC: 0.85 MG/DL
DEPRECATED RDW RBC AUTO: 41.9 FL (ref 35.1–46.3)
EGFRCR SERPLBLD CKD-EPI 2021: 94 ML/MIN/1.73M2 (ref 60–?)
ERYTHROCYTE [DISTWIDTH] IN BLOOD BY AUTOMATED COUNT: 13.2 % (ref 11–15)
GLUCOSE BLD-MCNC: 117 MG/DL (ref 70–99)
HCT VFR BLD AUTO: 39.1 %
HGB BLD-MCNC: 13.8 G/DL
MCH RBC QN AUTO: 30.7 PG (ref 26–34)
MCHC RBC AUTO-ENTMCNC: 35.3 G/DL (ref 31–37)
MCV RBC AUTO: 86.9 FL
OSMOLALITY SERPL CALC.SUM OF ELEC: 296 MOSM/KG (ref 275–295)
PLATELET # BLD AUTO: 155 10(3)UL (ref 150–450)
POTASSIUM SERPL-SCNC: 4.1 MMOL/L (ref 3.5–5.1)
RBC # BLD AUTO: 4.5 X10(6)UL
SODIUM SERPL-SCNC: 143 MMOL/L (ref 136–145)
WBC # BLD AUTO: 6.9 X10(3) UL (ref 4–11)

## 2024-03-23 PROCEDURE — 99239 HOSP IP/OBS DSCHRG MGMT >30: CPT | Performed by: HOSPITALIST

## 2024-03-23 RX ORDER — ASPIRIN 81 MG/1
81 TABLET ORAL DAILY
Qty: 30 TABLET | Refills: 0 | Status: SHIPPED | OUTPATIENT
Start: 2024-03-23

## 2024-03-23 RX ORDER — LISINOPRIL 20 MG/1
20 TABLET ORAL EVERY EVENING
Status: SHIPPED | COMMUNITY
Start: 2024-03-23

## 2024-03-23 RX ADMIN — CEFAZOLIN SODIUM/WATER 2 G: 2 G/20 ML SYRINGE (ML) INTRAVENOUS at 04:35:00

## 2024-03-23 RX ADMIN — LISINOPRIL 20 MG: 20 TABLET ORAL at 10:08:00

## 2024-03-23 RX ADMIN — DEXTROSE, SODIUM CHLORIDE, SODIUM LACTATE, POTASSIUM CHLORIDE, AND CALCIUM CHLORIDE: 5; .6; .31; .03; .02 INJECTION, SOLUTION INTRAVENOUS at 01:12:00

## 2024-03-23 RX ADMIN — ASPIRIN 81 MG: 81 TABLET ORAL at 10:08:00

## 2024-03-23 NOTE — PHYSICAL THERAPY NOTE
PHYSICAL THERAPY EVALUATION - INPATIENT     Room Number: 204/204-A  Evaluation Date: 3/23/2024  Type of Evaluation: Initial   Physician Order: PT Eval and Treat    Presenting Problem: AAA repair     Reason for Therapy: Mobility Dysfunction and Discharge Planning    PHYSICAL THERAPY ASSESSMENT   Patient is a 69 year old male admitted 3/22/2024 for AAA repair.  Prior to admission, patient's baseline is independent with performance of all ADL's and performance of functional mobility with no AD.  Patient is currently functioning near baseline with bed mobility, transfers, gait, stair negotiation, maintaining seated position, standing prolonged periods, and performing household tasks.  Patient is requiring supervision as a result of the following impairments: decreased functional strength, decreased endurance/aerobic capacity, pain, and medical status.  Physical Therapy will continue to follow for duration of hospitalization.    Patient will benefit from continued skilled PT Services For duration of hospitalization, however, given the patient is functioning near baseline level do not anticipate skilled therapy needs at discharge .    PLAN  PT Treatment Plan: Bed mobility;Body mechanics;Energy conservation;Endurance;Strengthening;Stair training;Transfer training  Rehab Potential : Good  Frequency (Obs): 3x/week    PHYSICAL THERAPY MEDICAL/SOCIAL HISTORY   History related to current admission: Patient is a 68 year old male who is here for  his abdominal aortic aneurysm treatment.  The patient had a screening abdominal ultrasound that revealed a large aneurysm and this was followed by referral to the ER where a CT angiogram was performed that revealed a 7.9 cm infrarenal aneurysm.  The patient has no family history of aneurysms.  He is otherwise very healthy and exercises on a daily basis with rowing and treadmill and weightlifting.  He denies any back pain.      Problem List  Active Problems:    Pre-op testing      HOME  SITUATION  Home Situation  Type of Home: House  Home Layout: Two level;Able to live on main level  Stairs to Enter : 1  Stairs to Bedroom: 20 (patient able to reside on main level)  Lives With: Spouse  Drives: Yes  Patient Owned Equipment: None  Patient Regularly Uses: None     Prior Level of [place]: Prior to admission patient was independent with performance of all ADL's and performance of functional mobility with no assistive device.    SUBJECTIVE  \"My wife is a PT\"    PHYSICAL THERAPY EXAMINATION   OBJECTIVE     Fall Risk: Standard fall risk    WEIGHT BEARING RESTRICTION  Weight Bearing Restriction: None     PAIN ASSESSMENT  Rating: Unable to rate  Location: incisional  Management Techniques: Activity promotion;Body mechanics;Breathing techniques;Relaxation;Repositioning    COGNITION  Overall Cognitive Status:  WFL - within functional limits  Arousal/Alertness:  appropriate responses to stimuli  Orientation Level:  oriented x4  Safety Judgement:  good awareness of safety precautions    RANGE OF MOTION AND STRENGTH ASSESSMENT  Upper extremity ROM and strength are within functional limits BUE.  Lower extremity ROM is within functional limits BLE.  Lower extremity strength is within functional limits BLE.    BALANCE  Static Sitting: Good  Dynamic Sitting: Fair  Static Standing: Good  Dynamic Standing: Fair    AM-PAC '6-Clicks' INPATIENT SHORT FORM - BASIC MOBILITY  How much difficulty does the patient currently have...  Patient Difficulty: Turning over in bed (including adjusting bedclothes, sheets and blankets)?: None   Patient Difficulty: Sitting down on and standing up from a chair with arms (e.g., wheelchair, bedside commode, etc.): None   Patient Difficulty: Moving from lying on back to sitting on the side of the bed?: None   How much help from another person does the patient currently need...   Help from Another: Moving to and from a bed to a chair (including a wheelchair)?: None   Help from Another:  Need to walk in hospital room?: None   Help from Another: Climbing 3-5 steps with a railing?: A Little     AM-PAC Score:  Raw Score: 23   Approx Degree of Impairment: 11.2%   Standardized Score (AM-PAC Scale): 56.93   CMS Modifier (G-Code): CI    FUNCTIONAL ABILITY STATUS  Functional Mobility/Gait Assessment  Gait Assistance: Supervision  Distance (ft): ~120 feet  Assistive Device: None  Pattern:  (decreased kavita, decreased step length, forward flexed posture, narrow base of support, verbal cues for sequencing, no overt loss of balance noted.)  Rolling: supervision  Supine to Sit: supervision  Sit to Supine: supervision  Sit to Stand: supervision    Exercise/Education Provided:  Bed mobility  Body mechanics  Energy conservation  Functional activity tolerated  Gait training  Strengthening  Transfer training    The patient's Approx Degree of Impairment: 11.2% has been calculated [facility] In[facility] Basic Mobility Short Form.  Research supports that patients with this level of impairment may benefit from home with no services.  Final disposition will be made by interdisciplinary medical team.    Patient End of Session: In bed;Needs met;Call light within reach;RN aware of session/findings;All patient questions and concerns addressed    CURRENT GOALS  Goals to be met by: 4/6/24  Patient Goal Patient's self-stated goal is: to return home   Goal #1 Patient is able to demonstrate supine - sit EOB @ level: independent     Goal #1   Current Status    Goal #2 Patient is able to demonstrate transfers Sit to/from Stand at assistance level: modified independent with  least restrictive device.     Goal #2  Current Status    Goal #3 Patient is able to ambulate >150 feet with assist device:  least restrictive device  at assistance level: modified independent   Goal #3   Current Status    Goal #4 Patient will negotiate 2 stairs/one curb w/ assistive device and supervision   Goal #4   Current  Status    Goal #5 Patient to demonstrate independence with home activity/exercise instructions provided to patient in preparation for discharge.   Goal #5   Current Status    Goal #6    Goal #6  Current Status      Patient Evaluation Complexity Level:  History Moderate - 1 or 2 personal factors and/or co-morbidities   Examination of body systems Moderate - addressing a total of 3 or more elements   Clinical Presentation  Moderate - Evolving   Clinical Decision Making  Low Complexity     Gait Training: 10 minutes    Skylar Salomon PT, DPT

## 2024-03-23 NOTE — PLAN OF CARE
Pt Aox4, NSR. Femoral sites, clean dry intact no hematoma. Assessed by MD pt deemed adequate for discharge.   Problem: CARDIOVASCULAR - ADULT  Goal: Maintains optimal cardiac output and hemodynamic stability  Description: INTERVENTIONS:  - Monitor vital signs, rhythm, and trends  - Monitor for bleeding, hypotension and signs of decreased cardiac output  - Evaluate effectiveness of vasoactive medications to optimize hemodynamic stability  - Monitor arterial and/or venous puncture sites for bleeding and/or hematoma  - Assess quality of pulses, skin color and temperature  - Assess for signs of decreased coronary artery perfusion - ex. Angina  - Evaluate fluid balance, assess for edema, trend weights  Outcome: Progressing  Goal: Absence of cardiac arrhythmias or at baseline  Description: INTERVENTIONS:  - Continuous cardiac monitoring, monitor vital signs, obtain 12 lead EKG if indicated  - Evaluate effectiveness of antiarrhythmic and heart rate control medications as ordered  - Initiate emergency measures for life threatening arrhythmias  - Monitor electrolytes and administer replacement therapy as ordered  Outcome: Progressing

## 2024-03-23 NOTE — DISCHARGE SUMMARY
[facility]  part of [facility]    Discharge Summary    Simon Harrsion Patient Status:  Observation    3/23/1955 MRN [id number]   Location [facility] 2W/SW Attending Cecil Whittaker MD   Hosp Day # 0 PCP Gray Yoder MD     Date of Admission: 3/22/2024 Disposition: Home or Self Care     Date of Discharge: 24      Admitting Diagnosis: Infrarenal abdominal aortic aneurysm (AAA) without rupture (HCC) [I71.43]    Hospital Discharge Diagnoses:  Infrarenal abdominal Aortic Aneurysm    Lace+ Score: 36  59-90 High Risk  29-58 Medium Risk  0-28   Low Risk.    TCM Follow-Up Recommendation:  LACE 29-58: Moderate Risk of readmission after discharge from the hospital.      Problem List:   Patient Active Problem List   Diagnosis    Essential hypertension    Benign prostatic hyperplasia with urinary frequency    Infrarenal abdominal aortic aneurysm (AAA) without rupture (HCC) 7.5 cm    [place] cardiac risk 10-20% in next 10 years    Hepatitis C antibody test negative    Statin declined    Pre-op testing       Reason for Admission:   Infrarenal Abdominal Aortic Aneurysm  HTN    Physical Exam:   General appearance: alert, appears stated age and cooperative  Pulmonary:  clear to auscultation bilaterally  Cardiovascular: S1, S2 normal, no murmur, click, rub or gallop, regular rate and rhythm  Abdominal: soft, non-tender; bowel sounds normal; no masses,  no organomegaly  Extremities: extremities normal, atraumatic, no cyanosis or edema  Psychiatric: calm      History of Present Illness:   Per Dr. Najjar   Patient is a 68 year old male who is here for  his abdominal aortic aneurysm treatment.  The patient had a screening abdominal ultrasound that revealed a large aneurysm and this was followed by referral to the ER where a CT angiogram was performed that revealed a 7.9 cm infrarenal aneurysm.  The patient has no family history of aneurysms.  He is otherwise very healthy and exercises on a daily  basis with rowing and treadmill and weightlifting.  He denies any back pain.       Hospital Course:   Infrarenal abdominal Aortic Aneurysm  -s/p endovascular abdominal aortic aneurysm repair using gore device with stent.   -pod #1  -pain control  -asa 81mg daily   -ok to dc home    HTN  -cont home lisinopril    Consultations:   Vascular surgery    Procedures:   -s/p endovascular abdominal aortic aneurysm repair using gore device with stent.     Complications: n/a    Discharge Condition: Good    Discharge Medications:      Discharge Medications        START taking these medications        Instructions Prescription details   aspirin 81 MG Tbec      Take 1 tablet (81 mg total) by mouth daily.   Quantity: 30 tablet  Refills: 0            CONTINUE taking these medications        Instructions Prescription details   lisinopril 20 MG Tabs  Commonly known as: Prinivil; Zestril      Take 0.5 tablets (10 mg total) by mouth every evening.   Refills: 0               Where to Get Your Medications        These medications were sent to [id number] - [place], [place] - [address] AT [facility] [address] [phone], [phone]  [address] [place] [address]      Hours: 24-hours Phone: [phone]   aspirin 81 MG Tbec         Follow up Visits: Follow-up with pcp in 1 week    Follow up Labs: n/a     Other Discharge Instructions: follow-up with Dr. Najjar FARAH S GHOUSE, MD  3/23/2024  9:17 AM    > 35 min

## 2024-03-23 NOTE — PROGRESS NOTES
[facility]  Vascular Surgery Progress Note    Simon Harrison Patient Status:  Observation    3/23/1955 MRN [id number]   Location [facility] 2W/SW Attending Cecil Whittaker MD   Hosp Day # 0 PCP Gray Yoder MD     Objective:   Temp: 99 °F (37.2 °C)  Pulse: 76  Resp: 12  BP: 133/87    Intake/Output:     Intake/Output Summary (Last 24 hours) at 3/23/2024 0809  Last data filed at 3/23/2024 0600  Gross per 24 hour   Intake 4950 ml   Output 3000 ml   Net 1950 ml         Events Yesterday/Overnight:  Stable overnight.  Denies any pain.  [place] is out and he was able to urinate    Exam:  Both groin incisions are soft with no hematoma or bruising.  He has palpable posterior tibial pulses that are strong    Impression/Plan:  Patient is ready for discharge.  He needs to follow-up in 2 weeks.  No heavy lifting.  He does not want Norco.  He should stay on aspirin 81 mg daily.      Medications:  Current Facility-Administered Medications   Medication Dose Route Frequency    lisinopril (Prinivil; Zestril) tab 20 mg  20 mg Oral Daily    dextrose in lactated ringers 5% infusion   Intravenous Continuous    acetaminophen (Tylenol) tab 650 mg  650 mg Oral Q4H PRN    Or    HYDROcodone-acetaminophen (Norco) 5-325 MG per tab 1 tablet  1 tablet Oral Q4H PRN    Or    HYDROcodone-acetaminophen (Norco) 5-325 MG per tab 2 tablet  2 tablet Oral Q4H PRN    ondansetron (Zofran) 4 MG/2ML injection 4 mg  4 mg Intravenous Q6H PRN    metoclopramide (Reglan) 5 mg/mL injection 10 mg  10 mg Intravenous Q8H PRN    enoxaparin (Lovenox) 40 MG/0.4ML SUBQ injection 40 mg  40 mg Subcutaneous Q24H    morphINE PF 2 MG/ML injection 1 mg  1 mg Intravenous Q2H PRN    Or    morphINE PF 2 MG/ML injection 2 mg  2 mg Intravenous Q2H PRN    Or    morphINE PF 4 MG/ML injection 4 mg  4 mg Intravenous Q2H PRN    aspirin DR tab 81 mg  81 mg Oral Daily    hydrALAzine (Apresoline) 20 mg/mL injection 10 mg  10 mg Intravenous Q3H PRN        Laboratory/Data:    LABS:  Recent Labs   Lab 03/19/24  0854 03/23/24  0447   WBC  --  6.9   HGB  --  13.8   MCV  --  86.9   PLT  --  155.0   INR 1.05  --        Recent Labs   Lab 03/23/24  0447      K 4.1      CO2 27.0   BUN 10   CREATSERUM 0.85   *   CA 8.5*     Recent Labs   Lab 03/19/24  0854   PTP 14.3   INR 1.05     No results for input(s): \"ALT\", \"AST\", \"ALB\", \"AMYLASE\", \"LIPASE\", \"LDH\" in the last 168 hours.    Invalid input(s): \"ALPHOS\", \"TBIL\", \"DBIL\", \"TPROT\"  No results for input(s): \"TROP\" in the last 168 hours.  No results found for: \"ANAS\", \"JESSY\", \"ANASCRN\"  No results for input(s): \"PCACT\", \"PSACT\", \"AT3ACT\", \"HIPAB\", \"PATHI\", \"STALA\", \"DRVVTRATIO\", \"DRVVT\", \"STACLOT\", \"CARIGG\", \"L2VU5NKPHF\", \"A3UU7JFGHH\", \"RA\", \"HAVIGM\", \"HBCIGM\", \"HCVAB\", \"HBSAG\", \"HBCAB\", \"HBVDNAINTERP\", \"ANAS\", \"C3\", \"C4\" in the last 168 hours.    Samer F. Najjar, MD  Vascular Surgery  [facility]  3/23/2024  8:09 AM

## 2024-03-23 NOTE — PLAN OF CARE
Problem: CARDIOVASCULAR - ADULT  Goal: Maintains optimal cardiac output and hemodynamic stability  Description: INTERVENTIONS:  - Monitor vital signs, rhythm, and trends  - Monitor for bleeding, hypotension and signs of decreased cardiac output  - Evaluate effectiveness of vasoactive medications to optimize hemodynamic stability  - Monitor arterial and/or venous puncture sites for bleeding and/or hematoma  - Assess quality of pulses, skin color and temperature  - Assess for signs of decreased coronary artery perfusion - ex. Angina  - Evaluate fluid balance, assess for edema, trend weights  Outcome: Progressing  Goal: Absence of cardiac arrhythmias or at baseline  Description: INTERVENTIONS:  - Continuous cardiac monitoring, monitor vital signs, obtain 12 lead EKG if indicated  - Evaluate effectiveness of antiarrhythmic and heart rate control medications as ordered  - Initiate emergency measures for life threatening arrhythmias  - Monitor electrolytes and administer replacement therapy as ordered  Outcome: Progressing

## 2024-03-23 NOTE — DISCHARGE INSTRUCTIONS
Follow-up with pcp per routine  Follow-up with Dr. Najjar as instructed  OTC tylenol for pain control

## 2024-03-23 NOTE — RESPIRATORY THERAPY NOTE
Patient refused IS therapy. [facility] has educated the patient on the purpose of and need for this therapy as well as potential negative outcomes associated with deferring treatment. Despite education, patient maintains refusal.

## 2024-03-25 ENCOUNTER — TELEPHONE (OUTPATIENT)
Facility: CLINIC | Age: 69
End: 2024-03-25

## 2024-03-25 ENCOUNTER — PATIENT OUTREACH (OUTPATIENT)
Dept: CASE MANAGEMENT | Age: 69
End: 2024-03-25

## 2024-03-25 ENCOUNTER — TELEPHONE (OUTPATIENT)
Dept: INTERNAL MEDICINE CLINIC | Facility: CLINIC | Age: 69
End: 2024-03-25

## 2024-03-25 DIAGNOSIS — I71.43 INFRARENAL ABDOMINAL AORTIC ANEURYSM (AAA) WITHOUT RUPTURE (HCC): Primary | ICD-10-CM

## 2024-03-25 DIAGNOSIS — Z02.9 ENCOUNTERS FOR UNSPECIFIED ADMINISTRATIVE PURPOSE: ICD-10-CM

## 2024-03-25 LAB
BLOOD TYPE BARCODE: 600
UNIT VOLUME: 350 ML

## 2024-03-25 NOTE — PROGRESS NOTES
Initial Post Discharge Follow Up   Discharge Date: 3/23/24  Contact Date: 3/25/2024    Consent Verification:  Assessment Completed With: Patient  HIPAA Verified?  Yes    Discharge Dx:   Infrarenal abdominal Aortic Aneurysm  -s/p endovascular abdominal aortic aneurysm repair using gore device with stent.   HTN    General:   How have you been since your discharge from the hospital? The patient reported feeling well at home. The patient did admit to bilateral groin incision discomfort described as tender to the touch (rated 1/10 for intensity). The patient denies any redness, pain, edema, discharge, foul odor or heat coming from the incision sites. The incisions were described as intact and soft to the touch. The patient also denies any malaise or fever. The patient denies any bilateral leg numbness/tingling, cyanosis/pallor or weakness. The patient denies any bilateral calf edema, pain, warmth, redness or tenderness. The patient denies any chest pain, trouble breathing, dizziness/syncope, irregular heart beat, or sweats. The patient also denies any hematuria, melena, hematochezia, or bleeding from the gums, nose or cuts. The patient reported a BP reading of 126/81. The patient denies any headache, confusion, or vision changes. The patient reported speaking with vascular surgery this morning about post-op symptoms, incisions, and wound care.   Do you have any pain since discharge?  Yes  Where: bilateral groin incision    Rating on pain scale 1-10, 10 being the worst pain you have ever experienced, what is current pain: 1  The patient reported the groin incision has been tender to touch.   Is the pain manageable at home? Yes  How well was your pain managed while in the hospital?   On a scale of 1-5   1- Very Poor and 5- Very well   Very Well  When you were leaving the hospital were your discharge instructions reviewed with you? Yes  How well were your discharge instructions explained to you?   On a scale of 1-5   1-  Very Poor and 5- Very well   Very Well  Do you have any questions about your discharge instructions?  No  Before leaving the hospital was your diagnoses explained to you? Yes  Do you have any questions about your diagnoses? No  Are you able to perform normal daily activities of living as you have prior to your hospital stay (dressing, bathing, ambulating to the bathroom, etc)? yes  (NCM) Was patient given a different diet per AVS? no      Medications:   Current Outpatient Medications   Medication Sig Dispense Refill    lisinopril 20 MG Oral Tab Take 0.5 tablets (10 mg total) by mouth every evening.      aspirin 81 MG Oral Tab EC Take 1 tablet (81 mg total) by mouth daily. 30 tablet 0     Were there any changes to your current medication(s) noted on the AVS? Yes  If so, were these medication changes discussed with you prior to leaving the hospital? Yes  If a new medication was prescribed:    Was the new medication's purpose & side effects reviewed? Yes  Do you have any questions about your new medication? No  Did you  your discharge medications when you left the hospital? Yes  Let's go over your medications together to make sure we are not missing anything. Medications Reviewed  Are there any reasons that keep you from taking your medication as prescribed? No  Are you having any concerns with constipation? No  Did patient receive their flu shot (Sept-March)? No    Discharge medications reviewed/discussed/and reconciled against outpatient medications with patient.  Any changes or updates to medications sent to PCP.  Patient Acknowledged     Referrals/orders at D/C:  Referrals/orders placed at D/C? no    Except for Home Health Services mentioned above, have you scheduled these other services? No    DME ordered at D/C? No      Discharge orders, AVS reviewed and discussed with patient. Any changes or updates to orders sent to PCP.  Patient Acknowledged      SDOH:   Transportation:   Transportation Needs: No  Transportation Needs (3/25/2024)    Transportation Needs     Lack of Transportation: No     Financial Strain:   Financial Resource Strain: Low Risk  (3/25/2024)    Financial Resource Strain     Difficulty of Paying Living Expenses: Not hard at all     Med Affordability: No       Follow up appointments:      Your appointments       Date & Time Appointment Department ([place])    Apr 05, 2024 11:00 AM CDT Post Op Visit with Cody Muñoz APRN [facility]        Feb 04, 2025  1:00 PM CST MA Supervisit with Gray Yoder MD [address] ([facility] [address])              [address]  [address] [place] [address] [address]  [facility] [address]  [phone] [facility]  [address] [facility] [address]  [facility] [address]  [phone]            TCC  Was TCC ordered: No    PCP (If no TCC appointment)  Does patient already have a PCP appointment scheduled? No  NCM Attempted to schedule PCP office TCM appointment with patient   If no appointment scheduled: Explain: The patient declined and would prefer to follow up with vascular surgery only at this time. A TE was sent to the PCP office for an appointment request.     Specialist    Does the patient have any other follow up appointment(s) needing to be scheduled? Yes  If yes: NCM reviewed upcoming specialist appointment with patient: Yes  The patient is scheduled with vascular surgery on 4/5/2024.   Does the patient need assistance scheduling appointment(s): No    Is there any reason as to why you cannot make your appointment(s)?  No     Needs post D/C:   Now that you are home, are there any needs or concerns you need addressed before your next visit with your PCP?  (DME, meds, questions, etc.):  No    Interventions by NCM:    Reviewed all discharge instructions with the patient with a focus on wound care and post-op directions. All medications were reviewed and educated on the importance of taking the medications as directed.  Patient symptoms were assessed, education was completed for signs/symptoms to monitor, and reviewed when to contact providers vs go to the ED/call 911. Appointments were reviewed and stressed the importance of a close follow up with providers. A TE was sent to the PCP office for an appointment request. The patient verbalized understanding and will contact the office with any further questions or concerns.     Overall Rating:   How would you rate the care you received while in the hospital? excellent    CCM referral placed:    No    BOOK BY DATE: 4/6/2024

## 2024-03-25 NOTE — TELEPHONE ENCOUNTER
Front staff, please contact pt to schedule TCM/HFU appt by 4/6/24 with any provider in this office, thanks

## 2024-03-25 NOTE — TELEPHONE ENCOUNTER
Patient wants to know if Dr Najjar wants to check his STENT, so patient wants to know if he will need to get any tests done before he comes in for his post up?

## 2024-03-25 NOTE — TELEPHONE ENCOUNTER
Patient was called and informed CTA of Abdomen and Pelvis will be ordered after his first post-op visit. He is doing well postoperatively with mild tenderness at the incision site.

## 2024-03-25 NOTE — PAYOR COMM NOTE
--------------  DISCHARGE REVIEW    Payor: [employer]  Subscriber #:  [id number]  Authorization Number: [id number]    Admit date: 3/22/24  Admit time:   2:15 PM  Discharge Date: 3/23/2024 12:37 PM     Admitting Physician: Najjar, Samer F, MD  Attending Physician:  No att. providers found  Primary Care Physician: Gray Yoder MD          Discharge Summary Notes        Discharge Summary signed by Cecil Whittaker MD at 3/23/2024  9:28 AM       Author: Cecil Whittaker MD Specialty: HOSPITALIST Author Type: Physician    Filed: 3/23/2024  9:28 AM Date of Service: 3/23/2024  9:17 AM Status: Signed    : Cecil Whittaker MD (Physician)         [facility]  part of [facility]    Discharge Summary    Simon Harrison     3/23/1955 MRN [id number]   Location [address]/ Attending Cecil Whittaker MD   Hosp Day # 0 PCP Gray Yoder MD     Date of Admission: 3/22/2024 Disposition: Home or Self Care     Date of Discharge: 24      Admitting Diagnosis: Infrarenal abdominal aortic aneurysm (AAA) without rupture (HCC) [I71.43]    Hospital Discharge Diagnoses:  Infrarenal abdominal Aortic Aneurysm    Lace+ Score: 36  59-90 High Risk  29-58 Medium Risk  0-28   Low Risk.    TCM Follow-Up Recommendation:  LACE 29-58: Moderate Risk of readmission after discharge from the hospital.      Problem List:   Patient Active Problem List   Diagnosis    Essential hypertension    Benign prostatic hyperplasia with urinary frequency    Infrarenal abdominal aortic aneurysm (AAA) without rupture (HCC) 7.5 cm    [place] cardiac risk 10-20% in next 10 years    Hepatitis C antibody test negative    Statin declined    Pre-op testing       Reason for Admission:   Infrarenal Abdominal Aortic Aneurysm  HTN    Physical Exam:   General appearance: alert, appears stated age and cooperative  Pulmonary:  clear to auscultation bilaterally  Cardiovascular: S1, S2 normal, no murmur, click, rub or  gallop, regular rate and rhythm  Abdominal: soft, non-tender; bowel sounds normal; no masses,  no organomegaly  Extremities: extremities normal, atraumatic, no cyanosis or edema  Psychiatric: calm      History of Present Illness:   Per Dr. Najjar   Patient is a 68 year old male who is here for  his abdominal aortic aneurysm treatment.  The patient had a screening abdominal ultrasound that revealed a large aneurysm and this was followed by referral to the ER where a CT angiogram was performed that revealed a 7.9 cm infrarenal aneurysm.  The patient has no family history of aneurysms.  He is otherwise very healthy and exercises on a daily basis with rowing and treadmill and weightlifting.  He denies any back pain.       Hospital Course:   Infrarenal abdominal Aortic Aneurysm  -s/p endovascular abdominal aortic aneurysm repair using gore device with stent.   -pod #1  -pain control  -asa 81mg daily   -ok to dc home    HTN  -cont home lisinopril    Consultations:   Vascular surgery    Procedures:   -s/p endovascular abdominal aortic aneurysm repair using gore device with stent.     Complications: n/a    Discharge Condition: Good    Discharge Medications:      Discharge Medications        START taking these medications        Instructions Prescription details   aspirin 81 MG Tbec      Take 1 tablet (81 mg total) by mouth daily.   Quantity: 30 tablet  Refills: 0            CONTINUE taking these medications        Instructions Prescription details   lisinopril 20 MG Tabs  Commonly known as: Prinivil; Zestril      Take 0.5 tablets (10 mg total) by mouth every evening.   Refills: 0               Where to Get Your Medications        These medications were sent to [id number] - [place], [place] - [address] AT [facility] [address] [phone], [phone]  [address] [address]      Hours: 24-hours Phone: [phone]   aspirin 81 MG Tbec         Follow up Visits: Follow-up  with pcp in 1 week    Follow up Labs: n/a     Other Discharge Instructions: follow-up with Dr. Najjar FARAH S GHOUSE, MD  3/23/2024  9:17 AM    > 35 min       Electronically signed by Cecil Denis MD on 3/23/2024  9:28 AM         REVIEWER COMMENTS

## 2024-03-25 NOTE — TELEPHONE ENCOUNTER
Spoke to the pt today for TCM. The patient was recently hospitalized for the following:     Infrarenal abdominal Aortic Aneurysm  -s/p endovascular abdominal aortic aneurysm repair using gore device with stent.   HTN    The pt does not have a TCM-HFU appt scheduled at this time and the patient declined scheduling with the NCM.The patient prefers to schedule after being seen by vascular surgery on 4/5/2024.       A TCM/HFU appt is recommended by 4/6/2024 as the pt is a moderate risk for readmission.  Please advise.    BOOK BY DATE (last date for TCM): 4/6/2024      Please f/u with the pt and assist with scheduling a TCM/HFU appt.  Thank you!

## 2024-04-04 ENCOUNTER — OFFICE VISIT (OUTPATIENT)
Facility: CLINIC | Age: 69
End: 2024-04-04

## 2024-04-04 VITALS — HEIGHT: 72 IN | RESPIRATION RATE: 18 BRPM | WEIGHT: 236 LBS | BODY MASS INDEX: 31.97 KG/M2

## 2024-04-04 DIAGNOSIS — I71.40 ABDOMINAL AORTIC ANEURYSM (AAA) WITHOUT RUPTURE, UNSPECIFIED PART (HCC): Primary | ICD-10-CM

## 2024-04-04 NOTE — PROGRESS NOTES
VASCULAR SURGERY OFFICE NOTE         Name: Simon Harrison   : 3/23/1955  [id number]     REASON FOR VISIT:   Patient is a 69 year old male who is here for his post op visit s/p percutaneous EVAR using the [place] device.  Has done well at home.  He reports minimal access site pain. Denies any erythema or drainage from the sites.      PAST MEDICAL HISTORY:   No past medical history on file.    PAST SURGICAL HISTORY:   No past surgical history on file.     MEDICATIONS:     Current Outpatient Medications   Medication Sig Dispense Refill    lisinopril 20 MG Oral Tab Take 1 tablet (20 mg total) by mouth every evening.      aspirin 81 MG Oral Tab EC Take 1 tablet (81 mg total) by mouth daily. 30 tablet 0       EXAM:   Resp 18   Ht 6' (1.829 m)   Wt 236 lb (107 kg)   BMI 32.01 kg/m²   The groin access sites have healed well with no bruits  DP/PT pulses are palpable     ASSESSMENT    Diagnoses and all orders for this visit:    Abdominal aortic aneurysm (AAA) without rupture, unspecified part (HCC)  -     CTA ABD/PEL (CPT=74174); Future        The patient is s/p endovascular repair of AAA with no complications  Will need a routine CTA of the abd/pelvis to assess for positioning and endoleaks.      PLAN:       CTA of the abd/pelvis followed by routine surveillance with abdominal US .  Follow up in one year to make sure there are no popliteal aneurysms.     MAYA Katz  Division of Vascular Surgery with Dr. Najjar.

## 2024-04-22 PROCEDURE — 82274 ASSAY TEST FOR BLOOD FECAL: CPT

## 2024-04-27 LAB — HEMOCCULT STL QL: NEGATIVE

## 2024-05-04 ENCOUNTER — HOSPITAL ENCOUNTER (OUTPATIENT)
Dept: CT IMAGING | Facility: HOSPITAL | Age: 69
Discharge: HOME | End: 2024-05-04
Payer: MEDICARE

## 2024-05-04 DIAGNOSIS — I71.40 ABDOMINAL AORTIC ANEURYSM (AAA) WITHOUT RUPTURE, UNSPECIFIED PART (HCC): ICD-10-CM

## 2024-05-04 LAB
CREAT BLD-MCNC: 1.1 MG/DL
EGFRCR SERPLBLD CKD-EPI 2021: 73 ML/MIN/1.73M2 (ref 60–?)

## 2024-05-04 PROCEDURE — 74174 CTA ABD&PLVS W/CONTRAST: CPT

## 2024-05-04 PROCEDURE — 82565 ASSAY OF CREATININE: CPT

## 2024-08-01 ENCOUNTER — OFFICE VISIT (OUTPATIENT)
Dept: INTERNAL MEDICINE CLINIC | Facility: CLINIC | Age: 69
End: 2024-08-01
Payer: COMMERCIAL

## 2024-08-01 VITALS
DIASTOLIC BLOOD PRESSURE: 70 MMHG | WEIGHT: 237 LBS | OXYGEN SATURATION: 98 % | RESPIRATION RATE: 16 BRPM | HEART RATE: 71 BPM | SYSTOLIC BLOOD PRESSURE: 118 MMHG | HEIGHT: 72 IN | BODY MASS INDEX: 32.1 KG/M2 | TEMPERATURE: 98 F

## 2024-08-01 DIAGNOSIS — I10 ESSENTIAL HYPERTENSION: Primary | ICD-10-CM

## 2024-08-01 PROBLEM — I71.43 INFRARENAL ABDOMINAL AORTIC ANEURYSM (AAA) WITHOUT RUPTURE (HCC): Status: RESOLVED | Noted: 2024-03-14 | Resolved: 2024-08-01

## 2024-08-01 PROBLEM — Z01.818 PRE-OP TESTING: Status: RESOLVED | Noted: 2024-03-22 | Resolved: 2024-08-01

## 2024-08-01 PROBLEM — Z86.79 HISTORY OF ABDOMINAL AORTIC ANEURYSM (AAA): Status: ACTIVE | Noted: 2024-08-01

## 2024-08-01 PROBLEM — I71.43 INFRARENAL ABDOMINAL AORTIC ANEURYSM (AAA) WITHOUT RUPTURE: Status: RESOLVED | Noted: 2024-03-14 | Resolved: 2024-08-01

## 2024-08-01 PROCEDURE — 3074F SYST BP LT 130 MM HG: CPT | Performed by: INTERNAL MEDICINE

## 2024-08-01 PROCEDURE — 1170F FXNL STATUS ASSESSED: CPT | Performed by: INTERNAL MEDICINE

## 2024-08-01 PROCEDURE — 99213 OFFICE O/P EST LOW 20 MIN: CPT | Performed by: INTERNAL MEDICINE

## 2024-08-01 PROCEDURE — 1160F RVW MEDS BY RX/DR IN RCRD: CPT | Performed by: INTERNAL MEDICINE

## 2024-08-01 PROCEDURE — 3078F DIAST BP <80 MM HG: CPT | Performed by: INTERNAL MEDICINE

## 2024-08-01 PROCEDURE — 1126F AMNT PAIN NOTED NONE PRSNT: CPT | Performed by: INTERNAL MEDICINE

## 2024-08-01 PROCEDURE — 3008F BODY MASS INDEX DOCD: CPT | Performed by: INTERNAL MEDICINE

## 2024-08-01 PROCEDURE — 1159F MED LIST DOCD IN RCRD: CPT | Performed by: INTERNAL MEDICINE

## 2024-08-01 RX ORDER — LISINOPRIL 10 MG/1
10 TABLET ORAL DAILY
COMMUNITY
Start: 2024-08-01

## 2024-08-01 NOTE — PROGRESS NOTES
Subjective:   Patient ID: Simon Harrison is a 69 year old male.    HPI  Simon Harrison is a 69 year old male.     HPI:   Patient presents for recheck of his hypertension. Pt has been taking medications as instructed, no medication side effects, home BP monitoring in the range of 110's systolic and 70's diastolic.  Doing well post AAA repair  Wt Readings from Last 6 Encounters:   08/01/24 237 lb (107.5 kg)   04/04/24 236 lb (107 kg)   03/22/24 236 lb (107 kg)   03/14/24 237 lb (107.5 kg)   03/13/24 237 lb (107.5 kg)   02/02/24 239 lb (108.4 kg)     Body mass index is 32.14 kg/m².    Lab Results   Component Value Date    CHOLEST 210 (H) 03/13/2024    CHOLEST 197 08/10/2023    CHOLEST 213 (H) 08/04/2022     Lab Results   Component Value Date    HDL 50 03/13/2024    HDL 49 08/10/2023    HDL 42 08/04/2022     Lab Results   Component Value Date    TRIG 75 03/13/2024    TRIG 83 08/10/2023    TRIG 132 08/04/2022     Lab Results   Component Value Date     (H) 03/13/2024     (H) 08/10/2023     (H) 08/04/2022     Lab Results   Component Value Date    AST 21 03/13/2024    AST 27 08/04/2022    AST 19 10/26/2020     Lab Results   Component Value Date    ALT 19 03/13/2024    ALT 38 08/04/2022    ALT 25 10/26/2020     Lab Results   Component Value Date     (H) 03/23/2024     (H) 03/13/2024     (H) 03/13/2024       Current Outpatient Medications   Medication Sig Dispense Refill    lisinopril 10 MG Oral Tab Take 1 tablet (10 mg total) by mouth daily.        History reviewed. No pertinent past medical history.   History reviewed. No pertinent surgical history.   Social History:    Social History     Socioeconomic History    Marital status:    Tobacco Use    Smoking status: Never    Smokeless tobacco: Never   Vaping Use    Vaping status: Never Used   Substance and Sexual Activity    Alcohol use: Yes     Comment: occ    Drug use: Never     Social Determinants of Health     Financial  Resource Strain: Low Risk  (3/25/2024)    Financial Resource Strain     Difficulty of Paying Living Expenses: Not hard at all     Med Affordability: No   Food Insecurity: No Food Insecurity (3/22/2024)    Food Insecurity     Food Insecurity: Never true   Transportation Needs: No Transportation Needs (3/25/2024)    Transportation Needs     Lack of Transportation: No   Housing Stability: Low Risk  (3/22/2024)    Housing Stability     Housing Instability: No         REVIEW OF SYSTEMS:   GENERAL HEALTH: feels well otherwise  SKIN: denies any unusual skin lesions or rashes  RESPIRATORY: denies shortness of breath with exertion  CARDIOVASCULAR: denies chest pain on exertion  GI: denies abdominal pain and denies heartburn  NEURO: denies headaches    EXAM:   /70   Pulse 71   Temp 98.2 °F (36.8 °C)   Resp 16   Ht 6' (1.829 m)   Wt 237 lb (107.5 kg)   SpO2 98%   BMI 32.14 kg/m²   GENERAL: well developed, well nourished,in no apparent distress  SKIN: no rashes,no suspicious lesions  HEENT: atraumatic, normocephalic,ears and throat are clear  NECK: supple,no adenopathy,no bruits  LUNGS: clear to auscultation  CARDIO: RRR without murmur  GI: good BS's,no masses, HSM or tenderness  EXTREMITIES: no cyanosis, clubbing or edema    ASSESSMENT AND PLAN:   Pt presents for a recheck of his hypertension. BP is well controlled, no significant medication side effects noted.  PLAN: will continue present medications, reviewed diet, exercise and weight control, check ECHO .   Declines statin  The patient indicates understanding of these issues and agrees to the plan.  The patient is asked to return in 6m.    History/Other:   Review of Systems  Current Outpatient Medications   Medication Sig Dispense Refill    lisinopril 10 MG Oral Tab Take 1 tablet (10 mg total) by mouth daily.       Allergies:No Known Allergies    Objective:   Physical Exam    Assessment & Plan:   1. Essential hypertension        No orders of the defined types  were placed in this encounter.      Meds This Visit:  Requested Prescriptions      No prescriptions requested or ordered in this encounter       Imaging & Referrals:  CARDIO - INTERNAL  CARD ECHO 2D DOPPLER (CPT=93306)

## 2024-08-02 PROBLEM — Z53.20 STATIN DECLINED: Status: RESOLVED | Noted: 2024-03-14 | Resolved: 2024-08-02

## 2024-10-04 ENCOUNTER — HOSPITAL ENCOUNTER (OUTPATIENT)
Dept: CV DIAGNOSTICS | Facility: HOSPITAL | Age: 69
Discharge: HOME | End: 2024-10-04
Attending: INTERNAL MEDICINE
Payer: MEDICARE

## 2024-10-04 DIAGNOSIS — I10 ESSENTIAL HYPERTENSION: ICD-10-CM

## 2024-10-04 DIAGNOSIS — I77.810 ASCENDING AORTA DILATION (HCC): Primary | ICD-10-CM

## 2024-10-04 PROCEDURE — 93306 TTE W/DOPPLER COMPLETE: CPT | Performed by: INTERNAL MEDICINE

## 2024-10-21 ENCOUNTER — ORDER TRANSCRIPTION (OUTPATIENT)
Dept: ADMINISTRATIVE | Facility: HOSPITAL | Age: 69
End: 2024-10-21

## 2024-10-21 DIAGNOSIS — I77.810 AORTIC ROOT DILATATION (HCC): Primary | ICD-10-CM

## 2024-10-21 DIAGNOSIS — I77.810 ASCENDING AORTA DILATION (HCC): ICD-10-CM

## 2024-10-21 DIAGNOSIS — Z86.79 HISTORY OF ABDOMINAL AORTIC ANEURYSM (AAA): ICD-10-CM

## 2024-12-30 NOTE — IMAGING NOTE
Pt scheduled for CT gated thoracic.  Discussed arrival time 1000.  Instructed to hold all caffeine, eat meals, hydrate well with water, and take all prescribed meds.   Pt verbalized understanding of all instructions.

## 2025-01-02 ENCOUNTER — HOSPITAL ENCOUNTER (OUTPATIENT)
Dept: CT IMAGING | Facility: HOSPITAL | Age: 70
Discharge: HOME | End: 2025-01-02
Attending: INTERNAL MEDICINE
Payer: MEDICARE

## 2025-01-02 VITALS — DIASTOLIC BLOOD PRESSURE: 82 MMHG | HEART RATE: 63 BPM | SYSTOLIC BLOOD PRESSURE: 118 MMHG

## 2025-01-02 DIAGNOSIS — Z86.79 HISTORY OF ABDOMINAL AORTIC ANEURYSM (AAA): ICD-10-CM

## 2025-01-02 DIAGNOSIS — I77.810 ASCENDING AORTA DILATION (HCC): ICD-10-CM

## 2025-01-02 DIAGNOSIS — I77.810 AORTIC ROOT DILATATION (HCC): ICD-10-CM

## 2025-01-02 LAB
CREAT BLD-MCNC: 1.1 MG/DL
EGFRCR SERPLBLD CKD-EPI 2021: 73 ML/MIN/1.73M2 (ref 60–?)

## 2025-01-02 PROCEDURE — 71275 CT ANGIOGRAPHY CHEST: CPT | Performed by: INTERNAL MEDICINE

## 2025-01-02 PROCEDURE — 82565 ASSAY OF CREATININE: CPT

## 2025-01-02 RX ORDER — METOPROLOL TARTRATE 1 MG/ML
5 INJECTION, SOLUTION INTRAVENOUS SEE ADMIN INSTRUCTIONS
Status: DISCONTINUED | OUTPATIENT
Start: 2025-01-02 | End: 2025-01-04

## 2025-01-02 RX ORDER — DILTIAZEM HYDROCHLORIDE 5 MG/ML
5 INJECTION INTRAVENOUS SEE ADMIN INSTRUCTIONS
Status: DISCONTINUED | OUTPATIENT
Start: 2025-01-02 | End: 2025-01-04

## 2025-01-02 NOTE — IMAGING NOTE
Patient to CT scan room 4, . IV placed by Cassidy CT tech. Reviewed patient's name,  and allergies. Procedure explained and questions answered. GFR = 73    Contrast injected followed by saline flush at 1042..  Contrast injection = 95ml  0.9 NS flush = 100ml  Average HR = 63    Patient tolerated the procedure without complication. Denies any contrast reaction. Discontinued IV saline lock. Escorted pt to [facility]'s Dressing Room and discharged in stable condition.

## 2025-01-14 ENCOUNTER — LAB ENCOUNTER (OUTPATIENT)
Dept: LAB | Age: 70
End: 2025-01-14
Attending: INTERNAL MEDICINE
Payer: MEDICARE

## 2025-01-14 DIAGNOSIS — E78.5 DYSLIPIDEMIA: ICD-10-CM

## 2025-01-14 LAB
CHOLEST SERPL-MCNC: 206 MG/DL (ref ?–200)
FASTING PATIENT LIPID ANSWER: YES
HDLC SERPL-MCNC: 46 MG/DL (ref 40–59)
LDLC SERPL CALC-MCNC: 143 MG/DL (ref ?–100)
NONHDLC SERPL-MCNC: 160 MG/DL (ref ?–130)
TRIGL SERPL-MCNC: 95 MG/DL (ref 30–149)
VLDLC SERPL CALC-MCNC: 18 MG/DL (ref 0–30)

## 2025-01-14 PROCEDURE — 36415 COLL VENOUS BLD VENIPUNCTURE: CPT

## 2025-01-14 PROCEDURE — 80061 LIPID PANEL: CPT

## 2025-01-24 ENCOUNTER — LAB ENCOUNTER (OUTPATIENT)
Dept: LAB | Age: 70
End: 2025-01-24
Attending: INTERNAL MEDICINE
Payer: MEDICARE

## 2025-01-24 DIAGNOSIS — I10 ESSENTIAL HYPERTENSION: ICD-10-CM

## 2025-01-24 DIAGNOSIS — Z12.5 PROSTATE CANCER SCREENING: ICD-10-CM

## 2025-01-24 LAB
BASOPHILS # BLD AUTO: 0.07 X10(3) UL (ref 0–0.2)
BASOPHILS NFR BLD AUTO: 1.1 %
COMPLEXED PSA SERPL-MCNC: 1.53 NG/ML (ref ?–4)
DEPRECATED RDW RBC AUTO: 42.8 FL (ref 35.1–46.3)
EOSINOPHIL # BLD AUTO: 0.18 X10(3) UL (ref 0–0.7)
EOSINOPHIL NFR BLD AUTO: 2.7 %
ERYTHROCYTE [DISTWIDTH] IN BLOOD BY AUTOMATED COUNT: 13.2 % (ref 11–15)
HCT VFR BLD AUTO: 44.5 %
HGB BLD-MCNC: 14.5 G/DL
IMM GRANULOCYTES # BLD AUTO: 0.01 X10(3) UL (ref 0–1)
IMM GRANULOCYTES NFR BLD: 0.2 %
LYMPHOCYTES # BLD AUTO: 1.99 X10(3) UL (ref 1–4)
LYMPHOCYTES NFR BLD AUTO: 30.2 %
MCH RBC QN AUTO: 28.8 PG (ref 26–34)
MCHC RBC AUTO-ENTMCNC: 32.6 G/DL (ref 31–37)
MCV RBC AUTO: 88.5 FL
MONOCYTES # BLD AUTO: 0.53 X10(3) UL (ref 0.1–1)
MONOCYTES NFR BLD AUTO: 8.1 %
NEUTROPHILS # BLD AUTO: 3.8 X10 (3) UL (ref 1.5–7.7)
NEUTROPHILS # BLD AUTO: 3.8 X10(3) UL (ref 1.5–7.7)
NEUTROPHILS NFR BLD AUTO: 57.7 %
PLATELET # BLD AUTO: 213 10(3)UL (ref 150–450)
RBC # BLD AUTO: 5.03 X10(6)UL
TSI SER-ACNC: 2.15 UIU/ML (ref 0.55–4.78)
WBC # BLD AUTO: 6.6 X10(3) UL (ref 4–11)

## 2025-01-24 PROCEDURE — 85025 COMPLETE CBC W/AUTO DIFF WBC: CPT

## 2025-01-24 PROCEDURE — 84443 ASSAY THYROID STIM HORMONE: CPT

## 2025-01-24 PROCEDURE — 36415 COLL VENOUS BLD VENIPUNCTURE: CPT

## 2025-01-28 ENCOUNTER — TELEPHONE (OUTPATIENT)
Facility: CLINIC | Age: 70
End: 2025-01-28

## 2025-01-28 ENCOUNTER — OFFICE VISIT (OUTPATIENT)
Dept: INTERNAL MEDICINE CLINIC | Facility: CLINIC | Age: 70
End: 2025-01-28
Payer: MEDICARE

## 2025-01-28 VITALS
HEART RATE: 80 BPM | BODY MASS INDEX: 33.32 KG/M2 | SYSTOLIC BLOOD PRESSURE: 112 MMHG | OXYGEN SATURATION: 98 % | DIASTOLIC BLOOD PRESSURE: 70 MMHG | HEIGHT: 72 IN | RESPIRATION RATE: 16 BRPM | WEIGHT: 246 LBS | TEMPERATURE: 98 F

## 2025-01-28 DIAGNOSIS — Z01.810 PREOP CARDIOVASCULAR EXAM: Primary | ICD-10-CM

## 2025-01-28 DIAGNOSIS — I10 ESSENTIAL HYPERTENSION: ICD-10-CM

## 2025-01-28 DIAGNOSIS — M16.11 PRIMARY OSTEOARTHRITIS OF RIGHT HIP: ICD-10-CM

## 2025-01-28 LAB
ATRIAL RATE: 82 BPM
P AXIS: 44 DEGREES
P-R INTERVAL: 238 MS
Q-T INTERVAL: 378 MS
QRS DURATION: 100 MS
QTC CALCULATION (BEZET): 441 MS
R AXIS: 9 DEGREES
T AXIS: 20 DEGREES
VENTRICULAR RATE: 82 BPM

## 2025-01-28 PROCEDURE — 93000 ELECTROCARDIOGRAM COMPLETE: CPT | Performed by: INTERNAL MEDICINE

## 2025-01-28 PROCEDURE — 99214 OFFICE O/P EST MOD 30 MIN: CPT | Performed by: INTERNAL MEDICINE

## 2025-01-28 NOTE — TELEPHONE ENCOUNTER
Patient is requesting to schedule an ultrasound.   He states that it was ordered in 11/2024.  Please call

## 2025-01-28 NOTE — H&P
[address]    History and Physical    Simon Harrison Patient Status:  No patient class for patient encounter    3/23/1955 MRN [id number]   Location [address] Attending No att. providers found   Hosp Day # 0 PCP Gray Yoder MD     Date:  2025    HPI:       HPI  Simon Harrison is a 69 year old male with a past medical history of HTN. BPH, aortic dilatation who presents for cardiac risk assessment a pre-operative physical exam. Patient is to have Right Anterior Total Hip Arthroplasty , to be done by Dr. Shantanu Casanova on 2025.      HPI:   Pt reports normal state of health  He denies chest pain or MENDEZ  He is opting for surgical correction of his right hip due to chronic worsening pain related to severe OA    The patient has had a recent ECHO 10/4/2024 which was favorable:  Conclusions:     1. Left ventricle: The cavity size was normal. Wall thickness was normal.      Systolic function was normal. The estimated ejection fraction was 60-65%,      by visual assessment. No diagnostic evidence for diffuse regional wall      motion abnormalities. No diagnostic evidence for regional wall motion      abnormalities. Left ventricular diastolic function is indeterminate.   2. Left atrium: The atrium was mildly dilated.   3. Aortic valve: The valve was structurally normal. The valve was      trileaflet.   4. Aortic root: The aortic root was mildly dilated and 4.7cm diameter.   5. Ascending aorta: The ascending aorta was mildly to moderately dilated and      4.8cm diameter.   6. Pulmonary arteries: Systolic pressure was within the normal range. The      peak systolic pressure is 23mm Hg. The end-diastolic pressure is 13mm Hg.   7. Inferior vena cava: The IVC was normal-sized. Respirophasic diameter      changes are in the normal range (> 50%).   Impressions:  No previous study from [facility] was   available  for comparison.   *     PAST MEDICAL, SOCIAL, FAMILY HISTORIES REVIEWED WITH PT      Current Outpatient Medications   Medication Sig Dispense Refill    rosuvastatin (CRESTOR) 10 MG Oral Tab Take 1 tablet (10 mg total) by mouth nightly. 90 tablet 0    lisinopril 10 MG Oral Tab Take 1 tablet (10 mg total) by mouth daily.        Allergies: Allergies[1]   History reviewed. No pertinent past medical history.   History reviewed. No pertinent surgical history.   History reviewed. No pertinent family history.   Social History:   Social History     Socioeconomic History    Marital status:    Tobacco Use    Smoking status: Never    Smokeless tobacco: Never   Vaping Use    Vaping status: Never Used   Substance and Sexual Activity    Alcohol use: Yes     Comment: occ    Drug use: Never     Social Drivers of Health     Financial Resource Strain: Low Risk  (3/25/2024)    Financial Resource Strain     Difficulty of Paying Living Expenses: Not hard at all     Med Affordability: No   Food Insecurity: No Food Insecurity (3/22/2024)    Food Insecurity     Food Insecurity: Never true   Transportation Needs: No Transportation Needs (3/25/2024)    Transportation Needs     Lack of Transportation: No   Housing Stability: Low Risk  (3/22/2024)    Housing Stability     Housing Instability: No      Occ: retired. :  yes. Children: yes.   Exercise: minimal.  Diet: watches minimally     REVIEW OF SYSTEMS:   A comprehensive 10 point review of systems was completed.     Pertinent positives and negatives noted in the HPI.      EXAM:   /70   Pulse 80   Temp 98.1 °F (36.7 °C)   Resp 16   Ht 6' (1.829 m)   Wt 246 lb (111.6 kg)   SpO2 98%   BMI 33.36 kg/m²   GENERAL: well developed, well nourished,in no apparent distress  SKIN: no rashes  HEENT: atraumatic, normocephalic  EYES:PERRLA, EOMI,   NECK: supple,no adenopathy,no bruits  LUNGS: clear to auscultation  CARDIO: RRR without murmur  GI: good BS's,no masses, HSM or  tenderness  EXTREMITIES: no cyanosis, clubbing or edema  NEURO: nonfocal    ASSESSMENT AND PLAN:   Simon Harrison is a 69 year old male with a past medical history of HTN. BPH, aortic dilatation who presents for cardiac risk assessment a pre-operative physical exam. Patient is to have Right Anterior Total Hip Arthroplasty , to be done by Dr. Shantanu Casanova on 1/31/2025.      According to the ACC/AHA guidelines, the patient does not have a history of acute coronary syndrome. His estimated perioperative risk for major adverse cardiac event is low based on combined clinical and surgical risks.  He is therefore cleared with low cardiac risk.  He will not require further cardiovascular testing.    EKG done in my office shows NSR. There normal intervals and no ST changes    CMP and CBC results were reviewed with the patient and the results are favorable    History   History reviewed. No pertinent past medical history.  History reviewed. No pertinent surgical history.  History reviewed. No pertinent family history.  Social History:  Social History     Socioeconomic History    Marital status:    Tobacco Use    Smoking status: Never    Smokeless tobacco: Never   Vaping Use    Vaping status: Never Used   Substance and Sexual Activity    Alcohol use: Yes     Comment: occ    Drug use: Never     Social Drivers of Health     Financial Resource Strain: Low Risk  (3/25/2024)    Financial Resource Strain     Difficulty of Paying Living Expenses: Not hard at all     Med Affordability: No   Food Insecurity: No Food Insecurity (3/22/2024)    Food Insecurity     Food Insecurity: Never true   Transportation Needs: No Transportation Needs (3/25/2024)    Transportation Needs     Lack of Transportation: No   Housing Stability: Low Risk  (3/22/2024)    Housing Stability     Housing Instability: No     Allergies/Medications:   Allergies: NKDA            Physical Exam:   Vital Signs:        Results:     Lab Results   Component Value  Date    WBC 6.6 01/24/2025    HGB 14.5 01/24/2025    HCT 44.5 01/24/2025    .0 01/24/2025    CREATSERUM 0.85 03/23/2024    BUN 10 03/23/2024     03/23/2024    K 4.1 03/23/2024     03/23/2024    CO2 27.0 03/23/2024     (H) 03/23/2024    CA 8.5 (L) 03/23/2024    ALB 4.5 03/13/2024    ALKPHO 50 03/13/2024    BILT 0.8 03/13/2024    TP 7.5 03/13/2024    AST 21 03/13/2024    ALT 19 03/13/2024    INR 1.05 03/19/2024    TSH 2.154 01/24/2025     No results found.              Gray Yoder MD  1/27/2025         [1] No Known Allergies

## 2025-01-28 NOTE — TELEPHONE ENCOUNTER
SG called patient and scheduled him for AA duplex. Order placed and informed patient to bring ID and insurance card

## 2025-01-29 ENCOUNTER — NURSE ONLY (OUTPATIENT)
Facility: CLINIC | Age: 70
End: 2025-01-29

## 2025-01-29 DIAGNOSIS — Z98.890 S/P VASCULAR SURGERY: ICD-10-CM

## 2025-01-29 DIAGNOSIS — I71.43 INFRARENAL ABDOMINAL AORTIC ANEURYSM (AAA) WITHOUT RUPTURE: ICD-10-CM

## 2025-04-09 ENCOUNTER — TELEPHONE (OUTPATIENT)
Dept: INTERNAL MEDICINE CLINIC | Facility: CLINIC | Age: 70
End: 2025-04-09

## 2025-04-09 DIAGNOSIS — I10 ESSENTIAL HYPERTENSION: ICD-10-CM

## 2025-04-09 DIAGNOSIS — Z12.5 PROSTATE CANCER SCREENING: ICD-10-CM

## 2025-04-09 NOTE — TELEPHONE ENCOUNTER
Patient requested to have all necessary lab work ready to complete prior to upcoming appointment.     Please notify patient when orders are ready.     Future Appointments   Date Time Provider Department Center   1/5/2026  9:00 AM Gray Yoder MD [facility] [address]

## (undated) NOTE — LETTER
[facility]  Administration of Anesthesia  I Simon Harrison agree to be cared for by a physician anesthesiologist alone and/or with a nurse anesthetist, who is specially trained to monitor me and give me medicine to put me to sleep or keep me comfortable during my procedure    I understand that my anesthesiologist and/or anesthetist is not an employee or agent of [facility] or [employer]. He or she works for [facility]    As the patient asking for anesthesia services, I agree to:  Allow the anesthesiologist (anesthesia doctor) to give me medicine and do additional procedures as necessary. Some examples are: Starting or using an “IV” to give me medicine, fluids or blood during my procedure, and having a breathing tube placed to help me breathe when I’m asleep (intubation). In the event that my heart stops working properly, I understand that my anesthesiologist will make every effort to sustain my life, unless otherwise directed by [facility] Do Not Resuscitate documents.  Tell my anesthesia doctor before my procedure:  If I am pregnant.  The last time that I ate or drank.  iii. All of the medicines I take (including prescriptions, herbal supplements, and pills I can buy without a prescription (including street drugs/illegal medications). Failure to inform my anesthesiologist about these medicines may increase my risk of anesthetic complications.  iv.If I am allergic to anything or have had a reaction to anesthesia before.  I understand how the anesthesia medicine will help me (benefits).  I understand that with any type of anesthesia medicine there are risks:  The most common risks are: nausea, vomiting, sore throat, muscle soreness, damage to my eyes, mouth, or teeth (from breathing tube placement).  Rare risks include: remembering what happened during my procedure, allergic reactions to medications, injury to my airway, heart, lungs, vision, nerves, or  muscles and in extremely rare instances death.  My doctor has explained to me other choices available to me for my care (alternatives).  Pregnant Patients (“epidural”):  I understand that the risks of having an epidural (medicine given into my back to help control pain during labor), include itching, low blood pressure, difficulty urinating, headache or slowing of the baby’s heart. Very rare risks include infection, bleeding, seizure, irregular heart rhythms and nerve injury.  Regional Anesthesia (“spinal”, “epidural”, & “nerve blocks”):  I understand that rare but potential complications include headache, bleeding, infection, seizure, irregular heart rhythms, and nerve injury.    _____________________________________________________________________________  Patient (or Representative) Signature/Relationship to Patient  Date   Time    _____________________________________________________________________________   Name (if used)    Language/Organization   Time    _____________________________________________________________________________  Nurse Anesthetist Signature     Date   Time  _____________________________________________________________________________  Anesthesiologist Signature     Date   Time  I have discussed the procedure and information above with the patient (or patient’s representative) and answered their questions. The patient or their representative has agreed to have anesthesia services.    _____________________________________________________________________________  Witness        Date   Time  I have verified that the signature is that of the patient or patient’s representative, and that it was signed before the procedure  Patient Name: Simon Harrison     : 3/23/1955                 Printed: 3/18/2024 at 9:33 AM    Medical Record #: [id number]                                            Page 1 of 1  ----------ANESTHESIA CONSENT----------

## (undated) NOTE — LETTER
[facility] INTERVENTIONAL SUITES  [address] [place]  [facility] [place] [address]  [phone]    Blood Transfusion Consent    In the course of your treatment, it may become necessary to administer a transfusion of blood or blood components. This form provides basic information concerning this procedure and, if signed by you, authorizes its administration. By signing this form, you agree that all of your questions about the administration of blood or blood products have been answered by the ordering medical professional or designee.    Description of Procedure  Blood is introduced into one of your veins, commonly in the arm, using a sterilized disposable needle. The amount of blood transfused, and whether the transfusion will be of blood or blood components is a judgement the physician will make based on your particular needs.    Risks  The transfusion is a common procedure of low risk.  MINOR AND TEMPORARY REACTIONS ARE NOT UNCOMMON, including a slight bruise, swelling or local reaction in the area where the needle pierces your skin, or a nonserious reaction to the transfused material itself, including headache, fever or mild skin reaction, such as rash.  Serious reactions are possible, though very unlikely, and include severe allergic reaction (shock) and destruction (hemolysis) of transfused blood cells.  Infectious diseases which are known to be transmitted by blood transfusion include certain types of viral Hepatitis(liver infection from a virus), Human Immunodeficiency Virus (HIV-1,2) infection, a viral infection known to cause Acquired Immunodeficiency Syndrome (AIDS), as well as certain other bacterial, viral, and parasitic diseases. While a minimal risk of acquiring an infectious disease from transfused blood exists, in accordance with the Federal and State law, all due care has been taken in donor selection and testing to avoid transmission of disease.    Alternatives  If loss of blood poses serious threats  during your treatment, THERE IS NO EFFECTIVE ALTERNATIVE TO BLOOD TRANSFUSION. However, if you have any further questions on this matter, your provider will fully explain the alternatives to you if it has not already been done.    I, ______________________________, have read/had read to me the above. I understand the matters bearing on the decision whether or not to authorize a transfusion of blood or blood components. I have no questions which have not been answered to my full satisfaction. I hereby consent to such transfusion as my physician may deem necessary or advisable in the course of my treatment.    ______________________________________________                    ___________________________  (Signature of Patient or Responsible party in case of minor,                 (Printed Name of Patient or incompetent, or unconscious patient)              Responsible Party)    ___________________________               _____________________  (Relationship to Patient if not self)                                    (Date and Time)    __________________________                                                           ______________________              (Signature of Witness)               (Printed Name of Witness)     Language line ()    Telephone/Verbal/Video Consent    __________________________                     ____________________  (Signature of 2nd Witness           (Printed Name of 2nd  Telephone/Verbal/Video Consent)           Witness)    Patient Name: Simon Harrison     : 3/23/1955                 Printed: 2024     Medical Record #: [id number]      Rev: 2023

## (undated) NOTE — LETTER
3/18/2024              Simon Harrison        [address] [place] [address]             To Whom it May Concern,     Patient has a vascular condition (Abdominal Aortic Aneurysm) which requires surgical intervention as soon as possible. For this reason, patient is not allowed to travel and upcoming travelling plans should be cancelled at this time. Patient will need close monitoring (for at least one year) after surgical procedure.               Sincerely,    Samer F Najjar, MD  Vascular Surgery Department  [address] [id number] [phone]

## (undated) NOTE — LETTER
[facility], [address] [place] [address]  [phone]          8/22/18      Tamar Felton,      As we disccussed over the phone, the stress test was normal but blood pressure was high.  Please monito

## (undated) NOTE — IP AVS SNAPSHOT
Patient Demographics     Address  [address] [place] [address] Phone  [phone] (Home)  [phone] (Mobile) *Preferred* E-mail Address  [email or web address]      Patient Contacts     Name Relation Home Work Mobile    Migdalia Frankel Spouse [phone]        Allergies as of 3/23/2024  Review status set to Review Complete on 3/22/2024   No Known Allergies     Code Status Information     Code Status    Not on file        Patient Instructions       Follow-up with pcp per routine  Follow-up with Dr. Najjar as instructed  OTC tylenol for pain control     Follow-up Information     Najjar, Samer F, MD Follow up in 2 week(s).    Specialty: SURGERY, VASCULAR  Why: hours to schedule are 0800 to 4;00 pm monday through friday  Please inform them Your surgeon DR Najjar instructed that you need to be seen in 2 weeks.  Contact information:  [address] [place] [address] [phone]             Gray Yoder MD Follow up in 1 week(s).    Specialties: Internal Medicine, IP Consult to Primary Care  Why: Please call Monday morning to make appointment  Contact information:  [address] [place] [address] [phone]                        Your Home Meds List      TAKE these medications       Instructions Authorizing Provider Morning Afternoon Evening As Needed   aspirin 81 MG Tbec  Next dose due: 3/24/2024      Take 1 tablet (81 mg total) by mouth daily.   MARLEN DENIS         lisinopril 20 MG Tabs  Commonly known as: Prinivil; Zestril  Next dose due: 3/24/2024      Take 0.5 tablets (10 mg total) by mouth every evening.   MARLEN DENIS               Where to Get Your Medications      These medications were sent to [id number] - [place], [place] - [address] AT [facility] [address] [phone], [phone]  [address] [place] [address] [place] [address]    Hours: 24-hours Phone: [phone]   aspirin 81 MG Tbec           204-204-A - MAR ACTION REPORT   (last 48 hrs)    ** SITE UNKNOWN **     Order ID Medication Name Action Time Action Reason Comments    [id number] acetaminophen (Tylenol Extra Strength) tab 1,000 mg 03/22/24 1006 Given      [id number] acetaminophen (Tylenol) tab 650 mg (Or Linked Group #3) 03/22/24 1657 Given      [id number] acetaminophen (Tylenol) tab 650 mg 03/22/24 2142 Given      [id number] aspirin DR tab 81 mg 03/23/24 1008 Given      [id number] ceFAZolin (Ancef) 2 g in 20mL IV syringe premix 03/22/24 2112 Given      [id number] ceFAZolin (Ancef) 2 g in 20mL IV syringe premix 03/23/24 0435 Given      [id number] dextrose in lactated ringers 5% infusion 03/22/24 1542 New Bag      [id number] dextrose in lactated ringers 5% infusion 03/23/24 0112 New Bag      [id number] famotidine (Pepcid) tab 20 mg (Or Linked Group #1) 03/22/24 1006 Given      [id number] hydrALAzine (Apresoline) 20 mg/mL injection 10 mg 03/22/24 1655 Given      [id number] iopamidol (ISOVUE-300) 61 % injection 200 mL 03/22/24 1334 Given      [id number] lisinopril (Prinivil; Zestril) tab 20 mg 03/22/24 1542 Given      [id number] lisinopril (Prinivil; Zestril) tab 20 mg 03/23/24 1008 Given      [id number] metoclopramide (Reglan) tab 10 mg (Or Linked Group #2) 03/22/24 1005 Given            LEFT LOWER ABDOMEN     Order ID Medication Name Action Time Action Reason Comments    [id number] enoxaparin (Lovenox) 40 MG/0.4ML SUBQ injection 40 mg 03/22/24 2112 Given              Recent Vital Signs    Flowsheet Row Most Recent Value   /92 Filed at 03/23/2024 1000   Pulse 97 Filed at 03/23/2024 1000   Resp 17 Filed at 03/23/2024 1000   Temp 98.5 °F (36.9 °C) Filed at 03/23/2024 0800   SpO2 98 % Filed at 03/23/2024 1000      Patient's Most Recent Weight    Flowsheet Row Most Recent Value   Patient Weight 107 kg (236 lb)         Lab Results Last 24 Hours      Basic Metabolic Panel (8) [[id number]] (Abnormal)  Resulted: 03/23/24 0523, Result status: Final result   Ordering provider: Najjar, Samer F, MD   03/22/24 2300 Resulting lab: [facility] ([facility])    Specimen Information    Type Source Collected On   Blood — 03/23/24 0447          Components    Component Value Reference Range Flag Lab   Glucose 117 70 - 99 mg/dL [facility] ([facility])   Sodium 143 136 - 145 mmol/L — [facility])   Potassium 4.1 3.5 - 5.1 mmol/L — [facility])   Chloride 111 98 - 112 mmol/L — [facility])   CO2 27.0 21.0 - 32.0 mmol/L — [facility])   Anion Gap 5 0 - 18 mmol/L — [facility])   BUN 10 9 - 23 mg/dL — [facility])   Creatinine 0.85 0.70 - 1.30 mg/dL — [facility])   BUN/CREA Ratio 11.8 10.0 - 20.0 — [facility])   Calcium, Total 8.5 8.7 - 10.4 mg/dL L [facility])   Calculated Osmolality 296 275 - 295 mOsm/kg [facility] ([facility])   eGFR-Cr 94 >=60 mL/min/1.73m2 — [facility])            CBC, Platelet; No Differential [[id number]] (Normal)  Resulted: 03/23/24 0505, Result status: Final result   Ordering provider: Najjar, Samer F, MD  03/22/24 2300 Resulting lab: [facility] ([facility])    Specimen Information    Type Source Collected On   Blood — 03/23/24 0447          Components    Component Value Reference Range Flag Lab   WBC 6.9 4.0 - 11.0 x10(3) uL — [facility])   RBC 4.50 3.80 - 5.80 x10(6)uL — [facility])   HGB 13.8 13.0 - 17.5 g/dL — [facility])   HCT 39.1 39.0 - 53.0 % — [facility])   MCV 86.9 80.0 - 100.0 fL — [facility] ([facility])   MCH 30.7 26.0 - 34.0 pg — [facility] ([facility])   MCHC 35.3 31.0 - 37.0 g/dL — [facility])   RDW 13.2 11.0 - 15.0 % — [facility])   RDW-SD 41.9 35.1 - 46.3 fL — [facility])   .0 150.0 - 450.0 10(3)uL — [facility] ([facility])            Testing Performed By     Lab - Abbreviation Name Director Address Valid Date Range    162 - [facility] ([facility]) [facility] [place]) [address]  [place] [address] 20 1442 - Present            Microbiology Results (All)     None         H&P - H&P Note      H&P signed by Najjar, Samer F, MD at 3/22/2024 10:07 AM  Version 1 of 1    Author: Najjar, Samer F, MD Service: Vascular Surgery Author Type: Physician    Filed: 3/22/2024 10:07 AM Date of Service: 3/22/2024 10:06 AM Status: Signed    : Najjar, Samer F, MD (Physician)       Samer F. Najjar, MD  Vascular Surgery  [facility]                     VASCULAR SURGERY   CLINIC NOTE           Name: Simon Harrison   :   3/23/1955  [id number]      REFERRING PHYSICIAN:  No ref. provider found  PRIMARY CARE PHYSICIAN:  Gray Yoder MD     HISTORY OF PRESENT ILLNESS:   Patient is a 68 year old male who is here for  his abdominal aortic aneurysm treatment.  The patient had a screening abdominal ultrasound that revealed a large aneurysm and this was followed by referral to the ER where a CT angiogram was performed that revealed a 7.9 cm infrarenal aneurysm.  The patient has no family history of aneurysms.  He is otherwise very healthy and exercises on a daily basis with rowing and treadmill and weightlifting.  He denies any back pain.           PAST MEDICAL HISTORY:    No past medical history on file.     PAST SURGICAL HISTORY:   No past surgical history on file.      MEDICATIONS:      Current Outpatient Medications:     lisinopril 20 MG Oral Tab, Take 1 tablet (20 mg total) by mouth daily., Disp: 90 tablet, Rfl: 3    finasteride 5 MG Oral Tab, Take 1 tablet (5 mg total) by mouth daily., Disp: 90 tablet, Rfl: 3     ALLERGIES:    He has No Known Allergies.     SOCIAL HISTORY:    Patient  reports that he has never smoked. He has never used smokeless tobacco. He reports current alcohol use. He reports that he does not use drugs.     FAMILY HISTORY:    Patient's family history is not on file.     ROS:     A 12 point review of systems with pertinent positives and negatives listed in the HPI.     EXAM:      Resp 18   Ht 6' (1.829 m)   Wt 237 lb (107.5 kg)   BMI 32.14 kg/m²      RESPIRATORY: no rales, rhonchi, or wheezes B  CARDIO: RRR without murmur, no murmur, no gallop   ABDOMEN: soft, non-tender   VASCULAR:   Easily palpable bilateral femoral and posterior tibial pulses   His popliteal arteries are nonaneurysmal        IMAGING:   The CT angiogram was reviewed with the patient     ASSESSMENT  Diagnoses and all orders for this visit:     Infrarenal abdominal aortic aneurysm (AAA) without rupture (HCC)           I explained to the patient that based on his CT angiogram of the abdomen and pelvis the aneurysm has crossed the size of 5 cm,  I would recommend early repair.  The aneurysm is amenable to endovascular repair.  We also discussed open repair. The risks of endovascular repair included: access site complications (both open and percutaneous), endoleaks (all types), device migration, separation of components, limb kinking and occlusion, endograft infection, in addition to cardiopulmonary complications, intravenous contrast complications (both contrast-induced nephropathy and allergic reactions) and ischemic complications (renal, intestinal, extremity, pelvic and spinal) including conversion to an open procedure and death.  We also discussed the signs and symptoms of a ruptured aneurysm with the importance of proceeding to the emergency room in that event.  All questions were answered.    The patient wants to proceed with repair.   He is will not require cardiac clearance given that he is in excellent physical shape and does not smoke.        PLAN:  Endovascular abdominal aortic aneurysm repair using the GORE device           Sincerely,  Samer F. Najjar MD    Electronically signed by Najjar, Samer F, MD on 3/22/2024 10:07 AM              Consults - MD Consult Notes      Consults filed by Magdiel Hawthorne MD at 3/22/2024  4:00 PM / Draft: Not Electronically Signed     Author: Magdiel Hawthorne MD Service:  Hospitalist Author Type: Physician    Filed: 3/22/2024  4:00 PM Status: Unsigned Transcription    : Magdiel Hawthorne MD (Physician)       [facility]    PATIENT'S NAME: TAMERA MENDOZA   ATTENDING PHYSICIAN: Samer F. Najjar, MD   CONSULTING PHYSICIAN: Magdiel Hawthorne MD   PATIENT ACCOUNT#:   [id number]    LOCATION:  [facility] PACU 8 [facility] 10  MEDICAL RECORD #:   [id number]       YOB: 1955  ADMISSION DATE:       03/22/2024      CONSULT DATE:  03/22/2024    REPORT OF CONSULTATION      REASON FOR CONSULTATION:  Abdominal aortic aneurysm endograft repair.    HISTORY OF PRESENT ILLNESS:  The patient is a 68-year-old  male who was found on a screening study ultrasound to have abdominal aortic aneurysm.  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  The patient was asymptomatic and referred to Vascular Surgery, Dr. Najjar, and scheduled today for above-mentioned procedure.  Postoperatively transferred to PACU for further monitoring.    PAST MEDICAL HISTORY:  Essential hypertension.  The patient denies any other medical problems.  Recent imaging studies showing infrarenal abdominal aortic aneurysm.    PAST SURGICAL HISTORY:  None.    MEDICATIONS:  Please see medication reconciliation list.    ALLERGIES:  No known drug allergies.    SOCIAL HISTORY:  No tobacco, alcohol, or drug use.  Lives with his family.  Independent in his basic activities of daily living.    REVIEW OF SYSTEMS:  Currently resting in bed.  No abdominal pain.  No chest pain, no shortness of breath.  Other 12-point review of systems negative.      PHYSICAL EXAMINATION:    GENERAL:  Alert.  Oriented to time, place, and person.  No acute distress.  VITAL SIGNS:  Temperature 98.3, pulse 53, respiratory rate 18, blood pressure 133/86, pulse ox 99%  on 2L nasal cannula oxygen.  HEENT:  Atraumatic.  Oropharynx clear, moist mucous membranes.  Ears, nose normal.  Eyes:  Anicteric sclerae.  NECK:  Supple.  No lymphadenopathy.  Trachea midline.  Full range of motion.  LUNGS:  Clear to auscultation bilaterally.  Normal respiratory effort.  HEART:  Regular rate and rhythm.  S1, S2 auscultated.  No murmur.  ABDOMEN:  Soft, nondistended, no tenderness.  Positive bowel sounds.  EXTREMITIES:  Bilateral groin dressing.  No edema, clubbing, or cyanosis.  Dorsalis pedis pulses palpated bilaterally.  NEUROLOGIC:  Motor and sensory intact.    ASSESSMENT AND PLAN:    1.   Infrarenal abdominal aortic aneurysm, status post endovascular abdominal aortic aneurysm repair using [place] device with stent.  Pain control, DVT prophylaxis, low dose aspirin, monitor hemodynamic status.  2.   Essential hypertension.  Continue home medications and monitor.    Dictated By Magdiel Hawthorne MD  d: 2024 14:40:37  t: 2024 14:51:35  Job [id number]  FB/                Discharge Summary - D/C Summary      Discharge Summary signed by Cecil Whittaker MD at 3/23/2024  9:28 AM  Version 1 of 1    Author: Cecil Whittaker MD Service: Hospitalist Author Type: Physician    Filed: 3/23/2024  9:28 AM Date of Service: 3/23/2024  9:17 AM Status: Signed    : Cecil Whittaker MD (Physician)       [facility]  [facility]    Discharge Summary    Simon Harrison Patient Status:  Observation    3/23/1955 MRN [id number]   Location [facility] 2W/SW Attending Cecil Whittaker MD   Hosp Day # 0 PCP Gray Yoder MD     Date of Admission: 3/22/2024 Disposition: Home or Self Care     Date of Discharge: 24      Admitting Diagnosis: Infrarenal abdominal aortic aneurysm (AAA) without rupture (HCC) [I71.43]    Hospital Discharge Diagnoses:  Infrarenal abdominal Aortic Aneurysm    Lace+ Score: 36  59-90 High Risk  29-58 Medium Risk  0-28   Low Risk.    TCM  Follow-Up Recommendation:  LACE 29-58: Moderate Risk of readmission after discharge from the hospital.      Problem List:   Patient Active Problem List   Diagnosis    Essential hypertension    Benign prostatic hyperplasia with urinary frequency    Infrarenal abdominal aortic aneurysm (AAA) without rupture (HCC) 7.5 cm    [place] cardiac risk 10-20% in next 10 years    Hepatitis C antibody test negative    Statin declined    Pre-op testing       Reason for Admission:   Infrarenal Abdominal Aortic Aneurysm  HTN    Physical Exam:   General appearance: alert, appears stated age and cooperative  Pulmonary:  clear to auscultation bilaterally  Cardiovascular: S1, S2 normal, no murmur, click, rub or gallop, regular rate and rhythm  Abdominal: soft, non-tender; bowel sounds normal; no masses,  no organomegaly  Extremities: extremities normal, atraumatic, no cyanosis or edema  Psychiatric: calm      History of Present Illness:   Per Dr. Najjar   Patient is a 68 year old male who is here for  his abdominal aortic aneurysm treatment.  The patient had a screening abdominal ultrasound that revealed a large aneurysm and this was followed by referral to the ER where a CT angiogram was performed that revealed a 7.9 cm infrarenal aneurysm.  The patient has no family history of aneurysms.  He is otherwise very healthy and exercises on a daily basis with rowing and treadmill and weightlifting.  He denies any back pain.       Hospital Course:   Infrarenal abdominal Aortic Aneurysm  -s/p endovascular abdominal aortic aneurysm repair using gore device with stent.   -pod #1  -pain control  -asa 81mg daily   -ok to dc home    HTN  -cont home lisinopril    Consultations:   Vascular surgery    Procedures:   -s/p endovascular abdominal aortic aneurysm repair using gore device with stent.     Complications: n/a    Discharge Condition: Good    Discharge Medications:      Discharge Medications        START taking these medications         Instructions Prescription details   aspirin 81 MG Tbec      Take 1 tablet (81 mg total) by mouth daily.   Quantity: 30 tablet  Refills: 0            CONTINUE taking these medications        Instructions Prescription details   lisinopril 20 MG Tabs  Commonly known as: Prinivil; Zestril      Take 0.5 tablets (10 mg total) by mouth every evening.   Refills: 0               Where to Get Your Medications        These medications were sent to [id number] - [place], [place] - [address] AT [facility] [address] [phone], [phone]  [address], [place] [address]      Hours: 24-hours Phone: [phone]   aspirin 81 MG Tbec         Follow up Visits: Follow-up with pcp in 1 week    Follow up Labs: n/a     Other Discharge Instructions: follow-up with Dr. Najjar FARAH S GHOUSE, MD  3/23/2024  9:17 AM    > 35 min       Electronically signed by Cecil Denis MD on 3/23/2024  9:28 AM           Physical Therapy Notes (last 72 hours)  Notes from 3/20/2024 10:57 AM through 3/23/2024 10:57 AM   No notes of this type exist for this encounter.     Occupational Therapy Notes (last 72 hours)  Notes from 3/20/2024 10:57 AM through 3/23/2024 10:57 AM   No notes of this type exist for this encounter.     Video Swallow Study Notes    No notes of this type exist for this encounter.     SLP Notes    No notes of this type exist for this encounter.     Future Appointments        Provider Department Center    2/4/2025 1:00 PM Gray Yoder MD [address]      Multidisciplinary Problems     Active Goals        Problem: Patient/Family Goals    Goal Priority Disciplines Outcome Interventions   Patient/Family Long Term Goal     Interdisciplinary     Description: Patient's Long Term Goal: ***    Interventions:  - ***  - See additional Care Plan goals for specific interventions   Patient/Family Short Term Goal     Interdisciplinary      Description: Patient's Short Term Goal: ***    Interventions:   - ***  - See additional Care Plan goals for specific interventions